# Patient Record
Sex: FEMALE | Race: WHITE | Employment: UNEMPLOYED | URBAN - METROPOLITAN AREA
[De-identification: names, ages, dates, MRNs, and addresses within clinical notes are randomized per-mention and may not be internally consistent; named-entity substitution may affect disease eponyms.]

---

## 2018-05-31 ENCOUNTER — HOSPITAL ENCOUNTER (OUTPATIENT)
Age: 23
Discharge: HOME | End: 2018-05-31
Payer: COMMERCIAL

## 2018-05-31 LAB
ALBUMIN SERPL-MCNC: 4.4 G/DL (ref 3.5–5.2)
ALP BLD-CCNC: 56 U/L (ref 35–104)
ALT SERPL-CCNC: 15 U/L (ref 0–32)
ANION GAP SERPL CALCULATED.3IONS-SCNC: 10 MMOL/L (ref 7–16)
AST SERPL-CCNC: 17 U/L (ref 0–31)
BASOPHILS ABSOLUTE: 0.05 E9/L (ref 0–0.2)
BASOPHILS RELATIVE PERCENT: 1 % (ref 0–2)
BILIRUB SERPL-MCNC: 0.7 MG/DL (ref 0–1.2)
BUN BLDV-MCNC: 8 MG/DL (ref 6–20)
CALCIUM SERPL-MCNC: 9 MG/DL (ref 8.6–10.2)
CHLORIDE BLD-SCNC: 105 MMOL/L (ref 98–107)
CO2: 27 MMOL/L (ref 22–29)
CREAT SERPL-MCNC: 0.7 MG/DL (ref 0.5–1)
EOSINOPHILS ABSOLUTE: 0.24 E9/L (ref 0.05–0.5)
EOSINOPHILS RELATIVE PERCENT: 4.9 % (ref 0–6)
GFR AFRICAN AMERICAN: >60
GFR NON-AFRICAN AMERICAN: >60 ML/MIN/1.73
GLUCOSE BLD-MCNC: 92 MG/DL (ref 74–109)
HCT VFR BLD CALC: 39.2 % (ref 34–48)
HEMOGLOBIN: 12.7 G/DL (ref 11.5–15.5)
IMMATURE GRANULOCYTES #: 0.02 E9/L
IMMATURE GRANULOCYTES %: 0.4 % (ref 0–5)
LYMPHOCYTES ABSOLUTE: 2.14 E9/L (ref 1.5–4)
LYMPHOCYTES RELATIVE PERCENT: 43.3 % (ref 20–42)
MCH RBC QN AUTO: 28.2 PG (ref 26–35)
MCHC RBC AUTO-ENTMCNC: 32.4 % (ref 32–34.5)
MCV RBC AUTO: 87.1 FL (ref 80–99.9)
MONOCYTES ABSOLUTE: 0.56 E9/L (ref 0.1–0.95)
MONOCYTES RELATIVE PERCENT: 11.3 % (ref 2–12)
NEUTROPHILS ABSOLUTE: 1.93 E9/L (ref 1.8–7.3)
NEUTROPHILS RELATIVE PERCENT: 39.1 % (ref 43–80)
PDW BLD-RTO: 13.3 FL (ref 11.5–15)
PLATELET # BLD: 223 E9/L (ref 130–450)
PMV BLD AUTO: 10.2 FL (ref 7–12)
POTASSIUM SERPL-SCNC: 4.2 MMOL/L (ref 3.5–5)
RBC # BLD: 4.5 E12/L (ref 3.5–5.5)
SEDIMENTATION RATE, ERYTHROCYTE: 1 MM/HR (ref 0–20)
SODIUM BLD-SCNC: 142 MMOL/L (ref 132–146)
TOTAL PROTEIN: 6.6 G/DL (ref 6.4–8.3)
WBC # BLD: 4.9 E9/L (ref 4.5–11.5)

## 2018-05-31 PROCEDURE — 36415 COLL VENOUS BLD VENIPUNCTURE: CPT

## 2018-05-31 PROCEDURE — 80053 COMPREHEN METABOLIC PANEL: CPT

## 2018-05-31 PROCEDURE — 85651 RBC SED RATE NONAUTOMATED: CPT

## 2018-05-31 PROCEDURE — 85025 COMPLETE CBC W/AUTO DIFF WBC: CPT

## 2018-06-22 NOTE — H&P
[address]                               HISTORY AND PHYSICAL    PATIENT NAME: Carl Freitas                    :        1995  MED REC NO:   [id number]                            ROOM:  ACCOUNT NO:   [de-identified]                           ADMIT DATE: 2018. PROVIDER:     Judah Delacruz MD    HISTORY OF PRESENT ILLNESS: The patient is a 28-year-old white female with  complaints of persistent pelvic pain unresponsive to medical therapy. She  also had an evaluation by GI with no abnormal findings. She now presents  for surgical evaluation. PAST MEDICAL HISTORY:  Significant for depression and anxiety. History of  supraventricular tachycardia. PAST SURGICAL HISTORY:  Spontaneous vaginal delivery x1, cardiac ablation. PAST GYN HISTORY:  History of herpes. No abnormal Paps, DVT, PE, or other  STDs. SOCIAL HISTORY:  No alcohol, tobacco or drugs. FAMILY HISTORY:  Noncontributory. ALLERGIES:  No known drug allergies. MEDICATIONS:  Oral contraceptive, multivitamins, Berry's wort. REVIEW OF SYSTEMS:  Negative for cardiac, respiratory, GI or   abnormalities. PHYSICAL EXAMINATION:  VITAL SIGNS[de-identified]  Stable. Blood pressure 84/56, weight 161, height 5 feet 4  inches, BMI 28. HEENT:  Negative. LUNGS:  Clear to auscultation. CARDIAC:  Regular rate and rhythm. BREASTS:  Fibrocystic. ABDOMEN:  Obese, soft, nondistended, nontender, no masses. PELVIC:  External genitalia within normal limits. VAGINA:  No lesions. CERVIX:  No lesions. UTERUS:  Anteverted 8 weeks size, mobile, nontender. ADNEXA:  Nontender, no masses. RECTOVAGINAL:  Deferred. EXTREMITIES:  No clubbing, cyanosis, edema. NEURO:  CN II-XII are grossly intact. She is alert and oriented x4. ASSESSMENT:  The patient with chronic pelvic pain.     PLAN:  Plan is for laparoscopy, possible laparotomy, other procedures as  indicated by operative findings. Risks of the procedure have been  reviewed, including but not limited to infection, bleeding, injury to  bowel, bladder, ureter, major vessels, rectovaginal and vesicovaginal  fistula, coital problems, and need for reoperation. She has been notified  in the events of excessive hemorrhage she may require blood transfusion,  and the risks of blood transfusion have been reviewed, as including but not  limited to fever, transfusion reaction, anemia, hepatitis, AIDS or other  infections. All her questions have been answered and she wishes to proceed  with surgery.         uYdi Walter MD    D: 06/22/2018 11:27:00       T: 06/22/2018 11:28:20     [email or web address]  Job#: [id number]     Doc#: [id number]    CC:

## 2018-06-28 ENCOUNTER — ANESTHESIA EVENT (OUTPATIENT)
Dept: OPERATING ROOM | Age: 23
End: 2018-06-28
Payer: COMMERCIAL

## 2018-06-28 RX ORDER — M-VIT,TX,IRON,MINS/CALC/FOLIC 27MG-0.4MG
1 TABLET ORAL DAILY
Status: ON HOLD | COMMUNITY
End: 2019-03-13

## 2018-06-29 ENCOUNTER — ANESTHESIA (OUTPATIENT)
Dept: OPERATING ROOM | Age: 23
End: 2018-06-29
Payer: COMMERCIAL

## 2018-06-29 ENCOUNTER — HOSPITAL ENCOUNTER (OUTPATIENT)
Age: 23
Setting detail: OUTPATIENT SURGERY
Discharge: HOME | End: 2018-06-29
Attending: LEGAL MEDICINE | Admitting: LEGAL MEDICINE
Payer: COMMERCIAL

## 2018-06-29 VITALS
OXYGEN SATURATION: 100 % | DIASTOLIC BLOOD PRESSURE: 50 MMHG | RESPIRATION RATE: 1 BRPM | SYSTOLIC BLOOD PRESSURE: 87 MMHG

## 2018-06-29 VITALS
WEIGHT: 156.8 LBS | RESPIRATION RATE: 16 BRPM | HEIGHT: 66 IN | TEMPERATURE: 97.5 F | OXYGEN SATURATION: 96 % | BODY MASS INDEX: 25.2 KG/M2 | SYSTOLIC BLOOD PRESSURE: 100 MMHG | DIASTOLIC BLOOD PRESSURE: 62 MMHG | HEART RATE: 97 BPM

## 2018-06-29 PROBLEM — R10.2 PELVIC PAIN IN FEMALE: Status: ACTIVE | Noted: 2018-06-29

## 2018-06-29 LAB
ABO/RH: NORMAL
ANTIBODY SCREEN: NORMAL
HCG QUALITATIVE: NEGATIVE

## 2018-06-29 PROCEDURE — 86900 BLOOD TYPING SEROLOGIC ABO: CPT

## 2018-06-29 PROCEDURE — 3600000014 HC SURGERY LEVEL 4 ADDTL 15MIN: Performed by: LEGAL MEDICINE

## 2018-06-29 PROCEDURE — 2580000003 HC RX 258

## 2018-06-29 PROCEDURE — 36415 COLL VENOUS BLD VENIPUNCTURE: CPT

## 2018-06-29 PROCEDURE — 2720000010 HC SURG SUPPLY STERILE: Performed by: LEGAL MEDICINE

## 2018-06-29 PROCEDURE — 84703 CHORIONIC GONADOTROPIN ASSAY: CPT

## 2018-06-29 PROCEDURE — 88305 TISSUE EXAM BY PATHOLOGIST: CPT

## 2018-06-29 PROCEDURE — 7100000001 HC PACU RECOVERY - ADDTL 15 MIN: Performed by: LEGAL MEDICINE

## 2018-06-29 PROCEDURE — 7100000000 HC PACU RECOVERY - FIRST 15 MIN: Performed by: LEGAL MEDICINE

## 2018-06-29 PROCEDURE — 6360000002 HC RX W HCPCS

## 2018-06-29 PROCEDURE — 3700000000 HC ANESTHESIA ATTENDED CARE: Performed by: LEGAL MEDICINE

## 2018-06-29 PROCEDURE — 7100000011 HC PHASE II RECOVERY - ADDTL 15 MIN: Performed by: LEGAL MEDICINE

## 2018-06-29 PROCEDURE — 2500000003 HC RX 250 WO HCPCS

## 2018-06-29 PROCEDURE — 2709999900 HC NON-CHARGEABLE SUPPLY: Performed by: LEGAL MEDICINE

## 2018-06-29 PROCEDURE — 6360000002 HC RX W HCPCS: Performed by: ANESTHESIOLOGY

## 2018-06-29 PROCEDURE — 86850 RBC ANTIBODY SCREEN: CPT

## 2018-06-29 PROCEDURE — 7100000010 HC PHASE II RECOVERY - FIRST 15 MIN: Performed by: LEGAL MEDICINE

## 2018-06-29 PROCEDURE — 2580000003 HC RX 258: Performed by: LEGAL MEDICINE

## 2018-06-29 PROCEDURE — 86901 BLOOD TYPING SEROLOGIC RH(D): CPT

## 2018-06-29 PROCEDURE — 6360000002 HC RX W HCPCS: Performed by: LEGAL MEDICINE

## 2018-06-29 PROCEDURE — 3600000004 HC SURGERY LEVEL 4 BASE: Performed by: LEGAL MEDICINE

## 2018-06-29 PROCEDURE — 3700000001 HC ADD 15 MINUTES (ANESTHESIA): Performed by: LEGAL MEDICINE

## 2018-06-29 RX ORDER — ONDANSETRON 2 MG/ML
INJECTION INTRAMUSCULAR; INTRAVENOUS PRN
Status: DISCONTINUED | OUTPATIENT
Start: 2018-06-29 | End: 2018-06-29 | Stop reason: SDUPTHER

## 2018-06-29 RX ORDER — SODIUM CHLORIDE, SODIUM LACTATE, POTASSIUM CHLORIDE, CALCIUM CHLORIDE 600; 310; 30; 20 MG/100ML; MG/100ML; MG/100ML; MG/100ML
INJECTION, SOLUTION INTRAVENOUS CONTINUOUS PRN
Status: DISCONTINUED | OUTPATIENT
Start: 2018-06-29 | End: 2018-06-29 | Stop reason: SDUPTHER

## 2018-06-29 RX ORDER — MORPHINE SULFATE 2 MG/ML
2 INJECTION, SOLUTION INTRAMUSCULAR; INTRAVENOUS EVERY 5 MIN PRN
Status: DISCONTINUED | OUTPATIENT
Start: 2018-06-29 | End: 2018-06-29 | Stop reason: HOSPADM

## 2018-06-29 RX ORDER — SODIUM CHLORIDE 0.9 % (FLUSH) 0.9 %
10 SYRINGE (ML) INJECTION EVERY 12 HOURS SCHEDULED
Status: DISCONTINUED | OUTPATIENT
Start: 2018-06-29 | End: 2018-06-29 | Stop reason: HOSPADM

## 2018-06-29 RX ORDER — ROCURONIUM BROMIDE 10 MG/ML
INJECTION, SOLUTION INTRAVENOUS PRN
Status: DISCONTINUED | OUTPATIENT
Start: 2018-06-29 | End: 2018-06-29 | Stop reason: SDUPTHER

## 2018-06-29 RX ORDER — PROPOFOL 10 MG/ML
INJECTION, EMULSION INTRAVENOUS PRN
Status: DISCONTINUED | OUTPATIENT
Start: 2018-06-29 | End: 2018-06-29 | Stop reason: SDUPTHER

## 2018-06-29 RX ORDER — ONDANSETRON 2 MG/ML
4 INJECTION INTRAMUSCULAR; INTRAVENOUS EVERY 6 HOURS PRN
Status: DISCONTINUED | OUTPATIENT
Start: 2018-06-29 | End: 2018-06-29 | Stop reason: HOSPADM

## 2018-06-29 RX ORDER — FENTANYL CITRATE 50 UG/ML
INJECTION, SOLUTION INTRAMUSCULAR; INTRAVENOUS PRN
Status: DISCONTINUED | OUTPATIENT
Start: 2018-06-29 | End: 2018-06-29 | Stop reason: SDUPTHER

## 2018-06-29 RX ORDER — SODIUM CHLORIDE, SODIUM LACTATE, POTASSIUM CHLORIDE, CALCIUM CHLORIDE 600; 310; 30; 20 MG/100ML; MG/100ML; MG/100ML; MG/100ML
INJECTION, SOLUTION INTRAVENOUS CONTINUOUS
Status: DISCONTINUED | OUTPATIENT
Start: 2018-06-29 | End: 2018-06-29 | Stop reason: HOSPADM

## 2018-06-29 RX ORDER — DEXAMETHASONE SODIUM PHOSPHATE 10 MG/ML
INJECTION, SOLUTION INTRAMUSCULAR; INTRAVENOUS PRN
Status: DISCONTINUED | OUTPATIENT
Start: 2018-06-29 | End: 2018-06-29 | Stop reason: SDUPTHER

## 2018-06-29 RX ORDER — OXYCODONE HYDROCHLORIDE AND ACETAMINOPHEN 5; 325 MG/1; MG/1
1 TABLET ORAL EVERY 4 HOURS PRN
Status: DISCONTINUED | OUTPATIENT
Start: 2018-06-29 | End: 2018-06-29 | Stop reason: HOSPADM

## 2018-06-29 RX ORDER — OXYCODONE HYDROCHLORIDE AND ACETAMINOPHEN 5; 325 MG/1; MG/1
2 TABLET ORAL EVERY 4 HOURS PRN
Status: DISCONTINUED | OUTPATIENT
Start: 2018-06-29 | End: 2018-06-29 | Stop reason: HOSPADM

## 2018-06-29 RX ORDER — ACETAMINOPHEN 325 MG/1
650 TABLET ORAL EVERY 4 HOURS PRN
Status: DISCONTINUED | OUTPATIENT
Start: 2018-06-29 | End: 2018-06-29 | Stop reason: HOSPADM

## 2018-06-29 RX ORDER — MIDAZOLAM HYDROCHLORIDE 1 MG/ML
INJECTION INTRAMUSCULAR; INTRAVENOUS PRN
Status: DISCONTINUED | OUTPATIENT
Start: 2018-06-29 | End: 2018-06-29 | Stop reason: SDUPTHER

## 2018-06-29 RX ORDER — MORPHINE SULFATE 2 MG/ML
1 INJECTION, SOLUTION INTRAMUSCULAR; INTRAVENOUS EVERY 5 MIN PRN
Status: DISCONTINUED | OUTPATIENT
Start: 2018-06-29 | End: 2018-06-29 | Stop reason: HOSPADM

## 2018-06-29 RX ORDER — SODIUM CHLORIDE 0.9 % (FLUSH) 0.9 %
10 SYRINGE (ML) INJECTION PRN
Status: DISCONTINUED | OUTPATIENT
Start: 2018-06-29 | End: 2018-06-29 | Stop reason: HOSPADM

## 2018-06-29 RX ORDER — LIDOCAINE HYDROCHLORIDE 20 MG/ML
INJECTION, SOLUTION INFILTRATION; PERINEURAL PRN
Status: DISCONTINUED | OUTPATIENT
Start: 2018-06-29 | End: 2018-06-29 | Stop reason: SDUPTHER

## 2018-06-29 RX ADMIN — CEFAZOLIN SODIUM 2 G: 2 SOLUTION INTRAVENOUS at 10:27

## 2018-06-29 RX ADMIN — DEXAMETHASONE SODIUM PHOSPHATE 10 MG: 10 INJECTION, SOLUTION INTRAMUSCULAR; INTRAVENOUS at 10:32

## 2018-06-29 RX ADMIN — SODIUM CHLORIDE, POTASSIUM CHLORIDE, SODIUM LACTATE AND CALCIUM CHLORIDE: 600; 310; 30; 20 INJECTION, SOLUTION INTRAVENOUS at 07:58

## 2018-06-29 RX ADMIN — ROCURONIUM BROMIDE 25 MG: 10 INJECTION, SOLUTION INTRAVENOUS at 10:55

## 2018-06-29 RX ADMIN — ONDANSETRON 4 MG: 2 INJECTION, SOLUTION INTRAMUSCULAR; INTRAVENOUS at 10:32

## 2018-06-29 RX ADMIN — SODIUM CHLORIDE, POTASSIUM CHLORIDE, SODIUM LACTATE AND CALCIUM CHLORIDE: 600; 310; 30; 20 INJECTION, SOLUTION INTRAVENOUS at 10:27

## 2018-06-29 RX ADMIN — HYDROMORPHONE HYDROCHLORIDE 0.5 MG: 1 INJECTION, SOLUTION INTRAMUSCULAR; INTRAVENOUS; SUBCUTANEOUS at 11:53

## 2018-06-29 RX ADMIN — ROCURONIUM BROMIDE 40 MG: 10 INJECTION, SOLUTION INTRAVENOUS at 10:32

## 2018-06-29 RX ADMIN — FENTANYL CITRATE 100 MCG: 50 INJECTION, SOLUTION INTRAMUSCULAR; INTRAVENOUS at 10:32

## 2018-06-29 RX ADMIN — LIDOCAINE HYDROCHLORIDE 80 MG: 20 INJECTION, SOLUTION INFILTRATION; PERINEURAL at 10:32

## 2018-06-29 RX ADMIN — PROPOFOL 180 MG: 10 INJECTION, EMULSION INTRAVENOUS at 10:32

## 2018-06-29 RX ADMIN — MIDAZOLAM HYDROCHLORIDE 2 MG: 1 INJECTION INTRAMUSCULAR; INTRAVENOUS at 10:27

## 2018-06-29 RX ADMIN — HYDROMORPHONE HYDROCHLORIDE 0.5 MG: 1 INJECTION, SOLUTION INTRAMUSCULAR; INTRAVENOUS; SUBCUTANEOUS at 11:59

## 2018-06-29 RX ADMIN — FENTANYL CITRATE 100 MCG: 50 INJECTION, SOLUTION INTRAMUSCULAR; INTRAVENOUS at 11:35

## 2018-06-29 ASSESSMENT — PULMONARY FUNCTION TESTS
PIF_VALUE: 1
PIF_VALUE: 15
PIF_VALUE: 27
PIF_VALUE: 14
PIF_VALUE: 19
PIF_VALUE: 29
PIF_VALUE: 15
PIF_VALUE: 18
PIF_VALUE: 28
PIF_VALUE: 1
PIF_VALUE: 19
PIF_VALUE: 27
PIF_VALUE: 1
PIF_VALUE: 1
PIF_VALUE: 3
PIF_VALUE: 1
PIF_VALUE: 27
PIF_VALUE: 15
PIF_VALUE: 13
PIF_VALUE: 28
PIF_VALUE: 28
PIF_VALUE: 29
PIF_VALUE: 1
PIF_VALUE: 31
PIF_VALUE: 14
PIF_VALUE: 18
PIF_VALUE: 27
PIF_VALUE: 23
PIF_VALUE: 15
PIF_VALUE: 28
PIF_VALUE: 28
PIF_VALUE: 18
PIF_VALUE: 19
PIF_VALUE: 27
PIF_VALUE: 15
PIF_VALUE: 27
PIF_VALUE: 28
PIF_VALUE: 30
PIF_VALUE: 13
PIF_VALUE: 19
PIF_VALUE: 27
PIF_VALUE: 1
PIF_VALUE: 28
PIF_VALUE: 28
PIF_VALUE: 19
PIF_VALUE: 28
PIF_VALUE: 13
PIF_VALUE: 28
PIF_VALUE: 23
PIF_VALUE: 28
PIF_VALUE: 19
PIF_VALUE: 28
PIF_VALUE: 26
PIF_VALUE: 16
PIF_VALUE: 19
PIF_VALUE: 27
PIF_VALUE: 15
PIF_VALUE: 27
PIF_VALUE: 19
PIF_VALUE: 24
PIF_VALUE: 16
PIF_VALUE: 28
PIF_VALUE: 16
PIF_VALUE: 16
PIF_VALUE: 30
PIF_VALUE: 15
PIF_VALUE: 15
PIF_VALUE: 28
PIF_VALUE: 28
PIF_VALUE: 26

## 2018-06-29 ASSESSMENT — PAIN DESCRIPTION - FREQUENCY
FREQUENCY: CONTINUOUS
FREQUENCY: INTERMITTENT
FREQUENCY: CONTINUOUS

## 2018-06-29 ASSESSMENT — PAIN DESCRIPTION - PAIN TYPE
TYPE: ACUTE PAIN;CHRONIC PAIN
TYPE: ACUTE PAIN;CHRONIC PAIN

## 2018-06-29 ASSESSMENT — PAIN SCALES - GENERAL
PAINLEVEL_OUTOF10: 1
PAINLEVEL_OUTOF10: 0
PAINLEVEL_OUTOF10: 0
PAINLEVEL_OUTOF10: 1
PAINLEVEL_OUTOF10: 5
PAINLEVEL_OUTOF10: 9

## 2018-06-29 ASSESSMENT — PAIN DESCRIPTION - ORIENTATION
ORIENTATION: POSTERIOR
ORIENTATION: POSTERIOR

## 2018-06-29 ASSESSMENT — PAIN DESCRIPTION - DESCRIPTORS
DESCRIPTORS: DULL
DESCRIPTORS: CONSTANT;DISCOMFORT;SORE
DESCRIPTORS: CONSTANT;DISCOMFORT;SORE

## 2018-06-29 ASSESSMENT — PAIN DESCRIPTION - ONSET
ONSET: ON-GOING
ONSET: ON-GOING

## 2018-06-29 ASSESSMENT — PAIN DESCRIPTION - LOCATION
LOCATION: OTHER (COMMENT);TOE (COMMENT WHICH ONE)
LOCATION: OTHER (COMMENT)

## 2018-06-29 ASSESSMENT — PAIN DESCRIPTION - PROGRESSION
CLINICAL_PROGRESSION: GRADUALLY WORSENING
CLINICAL_PROGRESSION: GRADUALLY IMPROVING

## 2018-06-29 ASSESSMENT — PAIN - FUNCTIONAL ASSESSMENT: PAIN_FUNCTIONAL_ASSESSMENT: 0-10

## 2018-06-29 NOTE — OP NOTE
Veress needle was  directed into the abdominal cavity at a 45 degree angle. Correct placement  was ensured with the water drop test.   3 liters of CO2 gas was  insufflated into the abdomen. Veress needle was discarded. Anterior  abdominal wall was sharply tented and a 5 mm trocar was directed into the  abdominal cavity at a 45 degrees angle. Correct placement was ensured with  the laparoscope. Under direct visualization, 5 mm trocars were placed in  the right and left lower quadrants, with care to avoid the obliterated  umbilical artery and inferior epigastric vessels. Pelvic findings were as  previously indicated. The left side wall was opened and adhesions binding  the endometriotic implants to the ureter were released with care to avoid  any injury to the ureter. The endometriotic implants were then resected. Care was made to assure there was no injury to the bowel or the ureter. Copious irrigation was performed with good hemostasis noted. 300 mL of  lactated Ringer's was allowed to remain in the pelvis in order to prevent  adhesion formation. All trocars removed under direct visualization after  CO2 gas was allowed to escape from the abdominal cavity. Skin incisions  reapproximated in running fashion. All instruments were removed from  vagina. Lr catheter was removed. Sponge, lap, needle counts were  correct. There were no complications. The patient tolerated procedure  well went to recovery room in stable condition.         Nelson Henley MD    D: 06/29/2018 12:17:39       T: 06/29/2018 12:19:57     [email or web address]  Job#: [id number]     Doc#: [id number]    CC:

## 2018-09-14 ENCOUNTER — HOSPITAL ENCOUNTER (OUTPATIENT)
Dept: ULTRASOUND IMAGING | Age: 23
Discharge: HOME | End: 2018-09-14
Payer: COMMERCIAL

## 2018-09-14 DIAGNOSIS — N64.52 DISCHARGE FROM LEFT NIPPLE: ICD-10-CM

## 2018-09-14 PROCEDURE — 76641 ULTRASOUND BREAST COMPLETE: CPT

## 2019-01-01 ENCOUNTER — HOSPITAL ENCOUNTER (OUTPATIENT)
Age: 24
Discharge: HOME | End: 2019-01-01
Attending: LEGAL MEDICINE | Admitting: LEGAL MEDICINE
Payer: COMMERCIAL

## 2019-01-01 VITALS — HEART RATE: 96 BPM | DIASTOLIC BLOOD PRESSURE: 55 MMHG | RESPIRATION RATE: 18 BRPM | SYSTOLIC BLOOD PRESSURE: 103 MMHG

## 2019-01-01 PROBLEM — Z34.92 NORMAL PREGNANCY IN SECOND TRIMESTER: Status: ACTIVE | Noted: 2019-01-01

## 2019-01-01 LAB
ALBUMIN SERPL-MCNC: 3.6 G/DL (ref 3.5–5.2)
ALP BLD-CCNC: 62 U/L (ref 35–104)
ALT SERPL-CCNC: 17 U/L (ref 0–32)
ANION GAP SERPL CALCULATED.3IONS-SCNC: 10 MMOL/L (ref 7–16)
AST SERPL-CCNC: 21 U/L (ref 0–31)
BASOPHILS ABSOLUTE: 0.04 E9/L (ref 0–0.2)
BASOPHILS RELATIVE PERCENT: 0.5 % (ref 0–2)
BILIRUB SERPL-MCNC: 0.4 MG/DL (ref 0–1.2)
BUN BLDV-MCNC: 6 MG/DL (ref 6–20)
CALCIUM SERPL-MCNC: 8.5 MG/DL (ref 8.6–10.2)
CHLORIDE BLD-SCNC: 104 MMOL/L (ref 98–107)
CO2: 22 MMOL/L (ref 22–29)
CREAT SERPL-MCNC: 0.5 MG/DL (ref 0.5–1)
EOSINOPHILS ABSOLUTE: 0.1 E9/L (ref 0.05–0.5)
EOSINOPHILS RELATIVE PERCENT: 1.3 % (ref 0–6)
GFR AFRICAN AMERICAN: >60
GFR NON-AFRICAN AMERICAN: >60 ML/MIN/1.73
GLUCOSE BLD-MCNC: 79 MG/DL (ref 74–99)
HCT VFR BLD CALC: 33.4 % (ref 34–48)
HEMOGLOBIN: 11 G/DL (ref 11.5–15.5)
IMMATURE GRANULOCYTES #: 0.08 E9/L
IMMATURE GRANULOCYTES %: 1.1 % (ref 0–5)
LYMPHOCYTES ABSOLUTE: 1.39 E9/L (ref 1.5–4)
LYMPHOCYTES RELATIVE PERCENT: 18.4 % (ref 20–42)
MCH RBC QN AUTO: 29 PG (ref 26–35)
MCHC RBC AUTO-ENTMCNC: 32.9 % (ref 32–34.5)
MCV RBC AUTO: 88.1 FL (ref 80–99.9)
MONOCYTES ABSOLUTE: 0.61 E9/L (ref 0.1–0.95)
MONOCYTES RELATIVE PERCENT: 8.1 % (ref 2–12)
NEUTROPHILS ABSOLUTE: 5.34 E9/L (ref 1.8–7.3)
NEUTROPHILS RELATIVE PERCENT: 70.6 % (ref 43–80)
PDW BLD-RTO: 13.2 FL (ref 11.5–15)
PLATELET # BLD: 201 E9/L (ref 130–450)
PMV BLD AUTO: 10.6 FL (ref 7–12)
POTASSIUM SERPL-SCNC: 3.8 MMOL/L (ref 3.5–5)
RBC # BLD: 3.79 E12/L (ref 3.5–5.5)
SODIUM BLD-SCNC: 136 MMOL/L (ref 132–146)
TOTAL PROTEIN: 6.1 G/DL (ref 6.4–8.3)
WBC # BLD: 7.6 E9/L (ref 4.5–11.5)

## 2019-01-01 PROCEDURE — 36415 COLL VENOUS BLD VENIPUNCTURE: CPT

## 2019-01-01 PROCEDURE — 99211 OFF/OP EST MAY X REQ PHY/QHP: CPT

## 2019-01-01 PROCEDURE — 85025 COMPLETE CBC W/AUTO DIFF WBC: CPT

## 2019-01-01 PROCEDURE — 80053 COMPREHEN METABOLIC PANEL: CPT

## 2019-01-15 ENCOUNTER — HOSPITAL ENCOUNTER (OUTPATIENT)
Dept: ULTRASOUND IMAGING | Age: 24
Discharge: HOME | End: 2019-01-15
Payer: COMMERCIAL

## 2019-01-15 DIAGNOSIS — R10.13 EPIGASTRIC PAIN: ICD-10-CM

## 2019-01-15 PROCEDURE — 76705 ECHO EXAM OF ABDOMEN: CPT

## 2019-02-21 ENCOUNTER — INITIAL PRENATAL (OUTPATIENT)
Dept: OBGYN CLINIC | Age: 24
End: 2019-02-21
Payer: COMMERCIAL

## 2019-02-21 VITALS
HEART RATE: 94 BPM | DIASTOLIC BLOOD PRESSURE: 64 MMHG | BODY MASS INDEX: 29.43 KG/M2 | SYSTOLIC BLOOD PRESSURE: 94 MMHG | WEIGHT: 183.13 LBS | HEIGHT: 66 IN

## 2019-02-21 DIAGNOSIS — M54.31 BILATERAL SCIATICA: ICD-10-CM

## 2019-02-21 DIAGNOSIS — Z3A.27 27 WEEKS GESTATION OF PREGNANCY: Primary | ICD-10-CM

## 2019-02-21 DIAGNOSIS — M54.32 BILATERAL SCIATICA: ICD-10-CM

## 2019-02-21 DIAGNOSIS — O41.8X10 SUBCHORIONIC HEMATOMA IN FIRST TRIMESTER, SINGLE OR UNSPECIFIED FETUS: ICD-10-CM

## 2019-02-21 DIAGNOSIS — Z86.79 PERSONAL HISTORY OF SUPRAVENTRICULAR TACHYCARDIA: ICD-10-CM

## 2019-02-21 DIAGNOSIS — Z87.42 PERSONAL HISTORY OF ENDOMETRIOSIS: ICD-10-CM

## 2019-02-21 DIAGNOSIS — I45.6 WOLFF-PARKINSON-WHITE (WPW) SYNDROME: ICD-10-CM

## 2019-02-21 DIAGNOSIS — Z83.49 FAMILY HISTORY OF HEMOCHROMATOSIS: ICD-10-CM

## 2019-02-21 DIAGNOSIS — O46.8X1 SUBCHORIONIC HEMATOMA IN FIRST TRIMESTER, SINGLE OR UNSPECIFIED FETUS: ICD-10-CM

## 2019-02-21 DIAGNOSIS — Z86.59 HISTORY OF DEPRESSION: ICD-10-CM

## 2019-02-21 LAB
GLUCOSE URINE, POC: NORMAL
PROTEIN UA: NEGATIVE

## 2019-02-21 PROCEDURE — 81002 URINALYSIS NONAUTO W/O SCOPE: CPT | Performed by: OBSTETRICS & GYNECOLOGY

## 2019-02-21 PROCEDURE — G8427 DOCREV CUR MEDS BY ELIG CLIN: HCPCS | Performed by: OBSTETRICS & GYNECOLOGY

## 2019-02-21 PROCEDURE — G8419 CALC BMI OUT NRM PARAM NOF/U: HCPCS | Performed by: OBSTETRICS & GYNECOLOGY

## 2019-02-21 PROCEDURE — G8484 FLU IMMUNIZE NO ADMIN: HCPCS | Performed by: OBSTETRICS & GYNECOLOGY

## 2019-02-21 PROCEDURE — 99201 HC NEW PT, E/M LEVEL 1: CPT | Performed by: OBSTETRICS & GYNECOLOGY

## 2019-02-21 PROCEDURE — 1036F TOBACCO NON-USER: CPT | Performed by: OBSTETRICS & GYNECOLOGY

## 2019-02-21 PROCEDURE — 76817 TRANSVAGINAL US OBSTETRIC: CPT | Performed by: OBSTETRICS & GYNECOLOGY

## 2019-02-21 PROCEDURE — 99204 OFFICE O/P NEW MOD 45 MIN: CPT | Performed by: OBSTETRICS & GYNECOLOGY

## 2019-02-21 PROCEDURE — 76811 OB US DETAILED SNGL FETUS: CPT | Performed by: OBSTETRICS & GYNECOLOGY

## 2019-02-21 PROCEDURE — 76819 FETAL BIOPHYS PROFIL W/O NST: CPT | Performed by: OBSTETRICS & GYNECOLOGY

## 2019-02-21 RX ORDER — MAGNESIUM OXIDE 400 MG/1
400 TABLET ORAL 2 TIMES DAILY
COMMUNITY

## 2019-03-07 ENCOUNTER — ROUTINE PRENATAL (OUTPATIENT)
Dept: OBGYN CLINIC | Age: 24
End: 2019-03-07
Payer: COMMERCIAL

## 2019-03-07 VITALS
SYSTOLIC BLOOD PRESSURE: 92 MMHG | HEART RATE: 98 BPM | WEIGHT: 189.38 LBS | BODY MASS INDEX: 30.57 KG/M2 | DIASTOLIC BLOOD PRESSURE: 63 MMHG

## 2019-03-07 DIAGNOSIS — Z86.79 PERSONAL HISTORY OF SUPRAVENTRICULAR TACHYCARDIA: ICD-10-CM

## 2019-03-07 DIAGNOSIS — M54.32 BILATERAL SCIATICA: ICD-10-CM

## 2019-03-07 DIAGNOSIS — Z83.49 FAMILY HISTORY OF HEMOCHROMATOSIS: ICD-10-CM

## 2019-03-07 DIAGNOSIS — I45.6 WOLFF-PARKINSON-WHITE (WPW) SYNDROME: ICD-10-CM

## 2019-03-07 DIAGNOSIS — O46.8X1 SUBCHORIONIC HEMATOMA IN FIRST TRIMESTER, SINGLE OR UNSPECIFIED FETUS: ICD-10-CM

## 2019-03-07 DIAGNOSIS — O41.8X10 SUBCHORIONIC HEMATOMA IN FIRST TRIMESTER, SINGLE OR UNSPECIFIED FETUS: ICD-10-CM

## 2019-03-07 DIAGNOSIS — Z87.42 PERSONAL HISTORY OF ENDOMETRIOSIS: ICD-10-CM

## 2019-03-07 DIAGNOSIS — Z3A.29 29 WEEKS GESTATION OF PREGNANCY: Primary | ICD-10-CM

## 2019-03-07 DIAGNOSIS — M54.31 BILATERAL SCIATICA: ICD-10-CM

## 2019-03-07 DIAGNOSIS — Z86.59 HISTORY OF DEPRESSION: ICD-10-CM

## 2019-03-07 LAB
GLUCOSE URINE, POC: NORMAL
PROTEIN UA: NEGATIVE

## 2019-03-07 PROCEDURE — 76819 FETAL BIOPHYS PROFIL W/O NST: CPT | Performed by: OBSTETRICS & GYNECOLOGY

## 2019-03-07 PROCEDURE — 81002 URINALYSIS NONAUTO W/O SCOPE: CPT | Performed by: OBSTETRICS & GYNECOLOGY

## 2019-03-07 PROCEDURE — 76815 OB US LIMITED FETUS(S): CPT | Performed by: OBSTETRICS & GYNECOLOGY

## 2019-03-07 PROCEDURE — G8427 DOCREV CUR MEDS BY ELIG CLIN: HCPCS | Performed by: OBSTETRICS & GYNECOLOGY

## 2019-03-07 PROCEDURE — G8417 CALC BMI ABV UP PARAM F/U: HCPCS | Performed by: OBSTETRICS & GYNECOLOGY

## 2019-03-07 PROCEDURE — 99211 OFF/OP EST MAY X REQ PHY/QHP: CPT | Performed by: OBSTETRICS & GYNECOLOGY

## 2019-03-07 PROCEDURE — 1036F TOBACCO NON-USER: CPT | Performed by: OBSTETRICS & GYNECOLOGY

## 2019-03-07 PROCEDURE — 99213 OFFICE O/P EST LOW 20 MIN: CPT | Performed by: OBSTETRICS & GYNECOLOGY

## 2019-03-07 PROCEDURE — G8484 FLU IMMUNIZE NO ADMIN: HCPCS | Performed by: OBSTETRICS & GYNECOLOGY

## 2019-03-07 PROCEDURE — 99211 OFF/OP EST MAY X REQ PHY/QHP: CPT

## 2019-03-13 ENCOUNTER — HOSPITAL ENCOUNTER (OUTPATIENT)
Age: 24
Discharge: HOME | End: 2019-03-14
Attending: LEGAL MEDICINE | Admitting: LEGAL MEDICINE
Payer: COMMERCIAL

## 2019-03-13 VITALS
WEIGHT: 183 LBS | TEMPERATURE: 98.8 F | HEIGHT: 66 IN | SYSTOLIC BLOOD PRESSURE: 123 MMHG | HEART RATE: 106 BPM | BODY MASS INDEX: 29.41 KG/M2 | DIASTOLIC BLOOD PRESSURE: 67 MMHG | RESPIRATION RATE: 16 BRPM

## 2019-03-14 PROBLEM — O26.90 PREGNANCY, ANTEPARTUM, COMPLICATIONS: Status: ACTIVE | Noted: 2019-03-14

## 2019-03-14 LAB
BACTERIA: NORMAL /HPF
BILIRUBIN URINE: NEGATIVE
BLOOD, URINE: ABNORMAL
CLARITY: CLEAR
COLOR: ABNORMAL
EPITHELIAL CELLS, UA: NORMAL /HPF
FETAL FIBRONECTIN: NEGATIVE
GLUCOSE URINE: NEGATIVE MG/DL
KETONES, URINE: NEGATIVE MG/DL
LEUKOCYTE ESTERASE, URINE: NEGATIVE
NITRITE, URINE: NEGATIVE
PH UA: 7 (ref 5–9)
PROTEIN UA: NEGATIVE MG/DL
RBC UA: NORMAL /HPF (ref 0–2)
SPECIFIC GRAVITY UA: <=1.005 (ref 1–1.03)
UROBILINOGEN, URINE: 0.2 E.U./DL
WBC UA: NORMAL /HPF (ref 0–5)

## 2019-03-14 PROCEDURE — 81001 URINALYSIS AUTO W/SCOPE: CPT

## 2019-03-14 PROCEDURE — 99211 OFF/OP EST MAY X REQ PHY/QHP: CPT

## 2019-03-14 PROCEDURE — 82731 ASSAY OF FETAL FIBRONECTIN: CPT

## 2019-04-10 ENCOUNTER — HOSPITAL ENCOUNTER (OUTPATIENT)
Dept: ULTRASOUND IMAGING | Age: 24
Discharge: HOME | End: 2019-04-10
Payer: COMMERCIAL

## 2019-04-10 DIAGNOSIS — R22.32 MASS OF LEFT AXILLA: ICD-10-CM

## 2019-04-10 PROCEDURE — 76642 ULTRASOUND BREAST LIMITED: CPT

## 2019-04-18 ENCOUNTER — ROUTINE PRENATAL (OUTPATIENT)
Dept: OBGYN CLINIC | Age: 24
End: 2019-04-18
Payer: COMMERCIAL

## 2019-04-18 VITALS
SYSTOLIC BLOOD PRESSURE: 109 MMHG | DIASTOLIC BLOOD PRESSURE: 71 MMHG | HEART RATE: 94 BPM | WEIGHT: 203.25 LBS | BODY MASS INDEX: 32.81 KG/M2

## 2019-04-18 DIAGNOSIS — M54.32 BILATERAL SCIATICA: ICD-10-CM

## 2019-04-18 DIAGNOSIS — Z86.79 PERSONAL HISTORY OF SUPRAVENTRICULAR TACHYCARDIA: ICD-10-CM

## 2019-04-18 DIAGNOSIS — Z86.59 HISTORY OF DEPRESSION: ICD-10-CM

## 2019-04-18 DIAGNOSIS — M54.31 BILATERAL SCIATICA: ICD-10-CM

## 2019-04-18 DIAGNOSIS — O46.8X1 SUBCHORIONIC HEMATOMA IN FIRST TRIMESTER, SINGLE OR UNSPECIFIED FETUS: ICD-10-CM

## 2019-04-18 DIAGNOSIS — Z83.49 FAMILY HISTORY OF HEMOCHROMATOSIS: ICD-10-CM

## 2019-04-18 DIAGNOSIS — O41.8X10 SUBCHORIONIC HEMATOMA IN FIRST TRIMESTER, SINGLE OR UNSPECIFIED FETUS: ICD-10-CM

## 2019-04-18 DIAGNOSIS — Z3A.35 35 WEEKS GESTATION OF PREGNANCY: Primary | ICD-10-CM

## 2019-04-18 DIAGNOSIS — I45.6 WOLFF-PARKINSON-WHITE (WPW) SYNDROME: ICD-10-CM

## 2019-04-18 LAB
GLUCOSE URINE, POC: NEGATIVE
PROTEIN UA: NEGATIVE

## 2019-04-18 PROCEDURE — 1036F TOBACCO NON-USER: CPT | Performed by: OBSTETRICS & GYNECOLOGY

## 2019-04-18 PROCEDURE — G8427 DOCREV CUR MEDS BY ELIG CLIN: HCPCS | Performed by: OBSTETRICS & GYNECOLOGY

## 2019-04-18 PROCEDURE — 99213 OFFICE O/P EST LOW 20 MIN: CPT | Performed by: OBSTETRICS & GYNECOLOGY

## 2019-04-18 PROCEDURE — 81002 URINALYSIS NONAUTO W/O SCOPE: CPT | Performed by: OBSTETRICS & GYNECOLOGY

## 2019-04-18 PROCEDURE — 99211 OFF/OP EST MAY X REQ PHY/QHP: CPT | Performed by: OBSTETRICS & GYNECOLOGY

## 2019-04-18 PROCEDURE — G8417 CALC BMI ABV UP PARAM F/U: HCPCS | Performed by: OBSTETRICS & GYNECOLOGY

## 2019-04-18 PROCEDURE — 76819 FETAL BIOPHYS PROFIL W/O NST: CPT | Performed by: OBSTETRICS & GYNECOLOGY

## 2019-04-18 PROCEDURE — 76816 OB US FOLLOW-UP PER FETUS: CPT | Performed by: OBSTETRICS & GYNECOLOGY

## 2019-04-18 RX ORDER — MULTIVIT WITH MINERALS/LUTEIN
250 TABLET ORAL 2 TIMES DAILY
COMMUNITY

## 2019-04-18 RX ORDER — DOXYCYCLINE HYCLATE 50 MG/1
324 CAPSULE, GELATIN COATED ORAL 2 TIMES DAILY WITH MEALS
COMMUNITY

## 2019-04-18 NOTE — PROGRESS NOTES
[facility]  [address]  [address] [facility]  Office [phone]  Fax [phone]    2019    RE:  Blanca Thompson     : 1995   AGE: 21 y.o. Dear Dr. Bernard Vargas,    Thank you for allowing me to provide prenatal consultation for Blanca Thompson. As I'm sure you will recall, Blanca Thompson is a 21 y.o. -0-0-1 Patient's last menstrual period was 2018 (approximate). Estimated Date of Delivery: 19 at 35 6/7 weeks seen in our office today for the following: Fetal growth exam and supraventricular tachycardia. REASON FOR CONSULTATION:  Patient Active Problem List    Diagnosis Date Noted    35 weeks gestation of pregnancy 2019    Personal history of supraventricular tachycardia 2019    Anson-Parkinson-White (WPW) syndrome 2019    Subchorionic hematoma in first trimester 2019    History of depression 2019    Family history of hemochromatosis 2019    Bilateral sciatica 2019    Personal history of endometriosis 2019     PAST HISTORY:  OB History    Para Term  AB Living   2 1 1     1   SAB TAB Ectopic Molar Multiple Live Births           0 1      # Outcome Date GA Lbr Marcos/2nd Weight Sex Delivery Anes PTL Lv   2 Current            1 Term  40w2d  7 lb 3 oz (3.26 kg) F Vag-Spont EPI N HONEY   GYNECOLOGICAL:  Last pap smear: Normal  Abnormal pap smears requiring surgical treatment No.  Sexually transmitted diseases: negative  negative for HPV, and Negative for other sexually transmitted diseases. MEDICAL:  Past Medical History:   Diagnosis Date    Anemia     had previously    Depression     Endometriosis     2018    Heart abnormality     Hx. SVT-ablation in     Herpes simplex virus (HSV) infection      SURGICAL:   positive for laparotomy for extensive endometriosis; heart ablation in 2015;  No other surgery    ALLERGIES:    Allergies   Allergen Reactions    Latex Rash      MEDICATIONS: Current Outpatient Medications   Medication Sig Dispense Refill    ferrous gluconate (FERGON) 324 (38 Fe) MG tablet Take 324 mg by mouth 2 times daily (with meals)      Ascorbic Acid (VITAMIN C) 250 MG tablet Take 250 mg by mouth 2 times daily      Prenatal Multivit-Min-Fe-FA (PRENATAL VITAMINS PO) Take 1 tablet by mouth daily      magnesium oxide (MAG-OX) 400 MG tablet Take 400 mg by mouth 2 times daily       No current facility-administered medications for this visit. Social History    Marital status:      Spouse name: N/A    Number of children: 1    Years of education: N/A     Social History Main Topics    Smoking status: Never Smoker    Smokeless tobacco: Never Used    Alcohol use No    Drug use: No    Sexual activity: Yes     Partners: Male     Review of Systems :   CONSTITUTIONAL : No fever, no chills   HEENT : No headache, no visual changes, no sore throat   CARDIOVASCULAR : No pain, no palpitations, some ankle edema   RESPIRATORY : No pain, no shortness of breath   GASTROINTESTINAL : N/V getting better, some low abdominal cramp-like pain abdominal pain   GENITOURINARY : No dysuria, hematuria and no incontinence   MUSCULOSKELETAL : No myalgia, Chronic back pain, some joint pain  NEUROLOGICAL : No numbness, no tingling, no tremors. No history of seizures; some bilateral sciatica pain. ALL OTHER SYSTEMS WERE REPORTED AS NEGATIVE. FAMILY MEDICAL HISTORY:   Family History   Problem Relation Age of Onset    [de-identified] / Stillbirths Mother     Heart Disease Father     Heart Disease Maternal Aunt     Heart Disease Maternal Grandfather     Depression Brother     Mental Illness Brother     Diabetes Paternal Uncle     Arthritis Paternal Grandmother     Arthritis Paternal Grandfather       OB GENETIC SCREEN:  OB Genetic Screening    Patient's Age 35+ at Date of Delivery No     Patient or [de-identified] Father Had Other Defects?  Father of baby with hemochromatosis        Jaelyn Hernandez was seen in our office today. She has no complaints today and is here for a fetal growth exam and history of supraventricular tachycardia. She reports good fetal movement and no bleeding or fluid leaking. Early in the pregnancy she did have some vaginal brown spotting. It was thought that she may have blood clot. She's had no further bleeding. In 2016, the patient had a full-term vaginal delivery of a living baby girl. She weighed 7 lbs. 3 oz. She states that it was an easy pregnancy and her history of supraventricular tachycardia was not a problem. She states that she was treated several times when symptomatic. There were times when her heart was beating irregularly and rapid. In 2015 she had a cardiac ablation at the [facility]. Her symptoms usually occur with pregnancy and involve a feeling of rapid heart rate, racing, and at times skipping a beat. She states that she does not get lightheaded. She recently states saw a cardiologist who believes that she had Anson-Parkinson-White syndrome. The cardiologist felt that she was stable and no treatment was needed at this time. She describes ONLY 1 recent situation where her heart stopped. She felt lightheaded and dizzy for about 10 seconds. Her heart started again and she has not had any further episodes of this. She had an appointment with electrophysiologist at the [facility] clinic and the physician (Dr. Reid Baxter) felt that she could have a vaginal delivery and no treatment was needed. She also has a history of depression and stress. She is currently seeing a counselor for this. No medications are needed at this time. PHYSICAL EXAMINATION:  /71   Pulse 94   Wt 203 lb 4 oz (92.2 kg)   LMP 08/11/2018 (Approximate)   Body mass index is 32.81 kg/m². General Appearance: Healthy looking, alert, no acute distress. Eyes: No pallor, no icterus, no photophobia. Abdomen: Soft, non-tender.    Extremities: No pretibial pitting edema  Pelvic: deferred  Urine dipstick:  Results for POC orders placed in visit on 04/18/19   POCT urine qual dipstick protein   Result Value Ref Range    Protein, UA Negative Negative   POCT urine qual dipstick glucose   Result Value Ref Range    Glucose, UA POC negative      An ultrasound evaluation was done in our office today. Please refer to the enclosed copy of the ultrasound report for further information. Lab Work Review:  reviewed    IMPRESSION:  1) Single intrauterine gestation at 28 6/7 weeks with fetal biometry consistent with dates. 2) The fetal anatomy appears normal and the fetal lie is vertex. 3) The amniotic fluid volume is WNL (CHEYANNE 12.3) and the placenta is anterior, left lateral.  4) The patient has supraventricular tachycardia and Kim Parkinson White syndrome. 5) Father of baby with hemachromatosis. 6) History of depression. 7) History of endometriosois  8) Multiple abdominal and back pains. 9) Vaginal spotting has stopped. RECOMMENDATIONS:  Each of the recommendations were discussed with the patient:  1) Single intrauterine gestation at 32 10/10 weeks with fetal biometry consistent with dates   2) Normal fetal anatomy and amniotic fluid volume is WNL (CHEYANNE-12.3). 3) VERTEX lie. 4) The patient has supraventricular tachycardia and Kim Parkinson White syndrome. She she appears to be stable at this time. 5) Father of baby with hemachromatosis. 6) History of depression. She is currently seeing a counselor and is doing well. 7) History of endometriosois  8) Occasioanl low abdominal and back pains that seem to come and go. 9) Vaginal spotting has stopped. 10) I suggested the patient continue daily fetal movement kick counts until delivery and continue oral fluids. No need for rescan at this time unless there is a clinical reason for her to return prior to that time. She is to call if she has any problems or questions prior to her next visit.  Further evaluation and management will be dependent on her clinical presentation and the results of her testing. The patient is to continue to follow with you in your office for ongoing obstetric care.     PLAN:    As noted above or sooner prn. Sincerely,        Sissy Fong MD    I spent 15 minutes of direct contact time with the patient of which greater than 50% of the time was used to  the patient, discuss complications and problems related to her pregnancy, or coordinating her care.  I answered all of her questions to her satisfaction.

## 2019-04-18 NOTE — PROGRESS NOTES
States baby is moving  Denies bleeding, leakage and contractions  States heart palpitations less    Call your obstetrician for:    Bleeding, leakage of fluid, abdominal tenderness, headaches, burred vision or  epigastric pain or decreased fetal movement or increased in urinary frequency.    Call if any questions or concerns

## 2019-04-18 NOTE — PATIENT INSTRUCTIONS
Patient Education        Weeks 34 to 39 of Your Pregnancy: Care Instructions  Your Care Instructions    By now, your baby and your belly have grown quite large. It is almost time to give birth. A full-term pregnancy can deliver between 37 and 42 weeks. Your baby's lungs are almost ready to breathe air. The bones in your baby's head are now firm enough to protect it, but soft enough to move down through the birth canal.  You may feel excited, happy, anxious, or scared. You may wonder how you will know if you are in labor or what to expect during labor. Try to be flexible in your expectations of the birth. Because each birth is different, there is no way to know exactly what childbirth will be like for you. This care sheet will help you know what to expect and how to prepare. This may make your childbirth easier. If you haven't already had the Tdap shot during this pregnancy, talk to your doctor about getting it. It will help protect your  against pertussis infection. In the 36th week, most women have a test for group B streptococcus (GBS). GBS is a common bacteria that can live in the vagina and rectum. It can make your baby sick after birth. If you test positive, you will get antibiotics during labor. The medicine will keep your baby from getting the bacteria. Follow-up care is a key part of your treatment and safety. Be sure to make and go to all appointments, and call your doctor if you are having problems. It's also a good idea to know your test results and keep a list of the medicines you take. How can you care for yourself at home? Learn about pain relief choices  · Pain is different for every woman. Talk with your doctor about your feelings about pain. · You can choose from several types of pain relief. These include medicine or breathing techniques, as well as comfort measures. You can use more than one option.   · If you choose to have pain medicine during labor, talk to your doctor about your options. Some medicines lower anxiety and help with some of the pain. Others make your lower body numb so that you won't feel pain. · Be sure to tell your doctor about your pain medicine choice before you start labor or very early in your labor. You may be able to change your mind as labor progresses. · Rarely, a woman is put to sleep by medicine given through a mask or an IV. Labor and delivery  · The first stage of labor has three parts: early, active, and transition. ? Most women have early labor at home. You can stay busy or rest, eat light snacks, drink clear fluids, and start counting contractions. ? When talking during a contraction gets hard, you may be moving to active labor. During active labor, you should head for the hospital if you are not there already. ? You are in active labor when contractions come every 3 to 4 minutes and last about 60 seconds. Your cervix is opening more rapidly. ? If your water breaks, contractions will come faster and stronger. ? During transition, your cervix is stretching, and contractions are coming more rapidly. ? You may want to push, but your cervix might not be ready. Your doctor will tell you when to push. · The second stage starts when your cervix is completely opened and you are ready to push. ? Contractions are very strong to push the baby down the birth canal.  ? You will feel the urge to push. You may feel like you need to have a bowel movement. ? You may be coached to push with contractions. These contractions will be very strong, but you will not have them as often. You can get a little rest between contractions. ? You may be emotional and irritable. You may not be aware of what is going on around you.  ? One last push, and your baby is born. · The third stage is when a few more contractions push out the placenta. This may take 30 minutes or less. · The fourth stage is the welcome recovery.  You may feel overwhelmed with emotions and exhausted but alert. This is a good time to start breastfeeding. Where can you learn more? Go to https://chpepiceweb.healthDel Taco. org and sign in to your [employer] account. Enter K500 in the [facility] box to learn more about \"Weeks 34 to 36 of Your Pregnancy: Care Instructions. \"     If you do not have an account, please click on the \"Sign Up Now\" link. Current as of: September 5, 2018  Content Version: 11.9  © 4432-8937 [employer]. Care instructions adapted under license by [facility] ([facility]). If you have questions about a medical condition or this instruction, always ask your healthcare professional. [place] [address] any warranty or liability for your use of this information. Patient Education        Learning About When to Call Your Doctor During Pregnancy (After 20 Weeks)  Your Care Instructions  It's common to have concerns about what might be a problem during pregnancy. Although most pregnant women don't have any serious problems, it's important to know when to call your doctor if you have certain symptoms or signs of labor. These are general suggestions. Your doctor may give you some more information about when to call. When to call your doctor (after 20 weeks)  Call 911 anytime you think you may need emergency care. For example, call if:  · You have severe vaginal bleeding. · You have sudden, severe pain in your belly. · You passed out (lost consciousness). · You have a seizure. · You see or feel the umbilical cord. · You think you are about to deliver your baby and can't make it safely to the hospital.  Call your doctor now or seek immediate medical care if:  · You have vaginal bleeding. · You have belly pain. · You have a fever. · You have symptoms of preeclampsia, such as:  ? Sudden swelling of your face, hands, or feet. ? New vision problems (such as dimness or blurring). ? A severe headache. · You have a sudden release of fluid from your vagina.  (You think your water broke.)  · You think that you may be in labor. This means that you've had at least 4 contractions within 20 minutes or at least 8 contractions in an hour. · You notice that your baby has stopped moving or is moving much less than normal.  · You have symptoms of a urinary tract infection. These may include:  ? Pain or burning when you urinate. ? A frequent need to urinate without being able to pass much urine. ? Pain in the flank, which is just below the rib cage and above the waist on either side of the back. ? Blood in your urine. Watch closely for changes in your health, and be sure to contact your doctor if:  · You have vaginal discharge that smells bad. · You have skin changes, such as:  ? A rash. ? Itching. ? Yellow color to your skin. · You have other concerns about your pregnancy. If you have labor signs at 37 weeks or more  If you have signs of labor at 37 weeks or more, your doctor may tell you to call when your labor becomes more active. Symptoms of active labor include:  · Contractions that are regular. · Contractions that are less than 5 minutes apart. · Contractions that are hard to talk through. Follow-up care is a key part of your treatment and safety. Be sure to make and go to all appointments, and call your doctor if you are having problems. It's also a good idea to know your test results and keep a list of the medicines you take. Where can you learn more? Go to https://Telanetixmani.healthAventine Renewable Energy Holdings. org and sign in to your [employer] account. Enter  in the [facility] box to learn more about \"Learning About When to Call Your Doctor During Pregnancy (After 20 Weeks). \"     If you do not have an account, please click on the \"Sign Up Now\" link. Current as of: September 5, 2018  Content Version: 11.9  © 1348-4909 [employer]. Care instructions adapted under license by [facility] ([facility]).  If you have questions about a medical condition or this instruction, always ask your healthcare professional. [address] any warranty or liability for your use of this information. Patient Education        Counting Your Baby's Kicks: Care Instructions  Your Care Instructions    Counting your baby's kicks is one way your doctor can tell that your baby is healthy. Most women--especially in a first pregnancy--feel their baby move for the first time between 16 and 22 weeks. The movement may feel like flutters rather than kicks. Your baby may move more at certain times of the day. When you are active, you may notice less kicking than when you are resting. At your prenatal visits, your doctor will ask whether the baby is active. In your last trimester, your doctor may ask you to count the number of times you feel your baby move. Follow-up care is a key part of your treatment and safety. Be sure to make and go to all appointments, and call your doctor if you are having problems. It's also a good idea to know your test results and keep a list of the medicines you take. How do you count fetal kicks? · A common method of checking your baby's movement is to count the number of kicks or moves you feel in 1 hour. Ten movements (such as kicks, flutters, or rolls) in 1 hour are normal. Some doctors suggest that you count in the morning until you get to 10 movements. Then you can quit for that day and start again the next day. · Pick your baby's most active time of day to count. This may be any time from morning to evening. · If you do not feel 10 movements in an hour, your baby may be sleeping. Wait for the next hour and count again. When should you call for help? Call your doctor now or seek immediate medical care if:    · You noticed that your baby has stopped moving or is moving much less than normal.    Watch closely for changes in your health, and be sure to contact your doctor if you have any problems. Where can you learn more?   Go to

## 2019-05-06 ENCOUNTER — HOSPITAL ENCOUNTER (OUTPATIENT)
Age: 24
Discharge: HOME | End: 2019-05-06
Attending: LEGAL MEDICINE | Admitting: LEGAL MEDICINE
Payer: COMMERCIAL

## 2019-05-06 ENCOUNTER — APPOINTMENT (OUTPATIENT)
Dept: ULTRASOUND IMAGING | Age: 24
End: 2019-05-06
Payer: COMMERCIAL

## 2019-05-06 VITALS
TEMPERATURE: 98.3 F | SYSTOLIC BLOOD PRESSURE: 109 MMHG | HEART RATE: 105 BPM | RESPIRATION RATE: 18 BRPM | DIASTOLIC BLOOD PRESSURE: 68 MMHG

## 2019-05-06 PROBLEM — Z3A.38 38 WEEKS GESTATION OF PREGNANCY: Status: ACTIVE | Noted: 2019-05-06

## 2019-05-06 LAB
BILIRUBIN URINE: NEGATIVE
BLOOD, URINE: NEGATIVE
CLARITY: CLEAR
COLOR: YELLOW
GLUCOSE URINE: NEGATIVE MG/DL
KETONES, URINE: NEGATIVE MG/DL
LEUKOCYTE ESTERASE, URINE: NEGATIVE
NITRITE, URINE: NEGATIVE
PH UA: 6 (ref 5–9)
PROTEIN UA: NEGATIVE MG/DL
SPECIFIC GRAVITY UA: 1.01 (ref 1–1.03)
UROBILINOGEN, URINE: 0.2 E.U./DL

## 2019-05-06 PROCEDURE — 99211 OFF/OP EST MAY X REQ PHY/QHP: CPT

## 2019-05-06 PROCEDURE — 76819 FETAL BIOPHYS PROFIL W/O NST: CPT

## 2019-05-06 PROCEDURE — 84112 EVAL AMNIOTIC FLUID PROTEIN: CPT

## 2019-05-06 PROCEDURE — 81003 URINALYSIS AUTO W/O SCOPE: CPT

## 2019-05-06 RX ORDER — ACYCLOVIR 400 MG/1
400 TABLET ORAL
COMMUNITY

## 2019-05-06 NOTE — PROGRESS NOTES
Spoke to Dr. Missael Leung to notify her of BPP results and urinalysis.  Pt OK to be discharged per Dr. Missael Leung

## 2019-05-06 NOTE — PROGRESS NOTES
ambulatory to unit with complaint of lof since about 10 am, pt states it was a small amount of clear fluid, pt did not have to wear a panty liner. Maternal perception of fetal movement present, efm applied.  amnisure dfone

## 2019-05-07 NOTE — H&P
[address]                              HISTORY AND PHYSICAL    PATIENT NAME: Gabby Son                    :        1995  MED REC NO:   [id number]                            ROOM:       LD05  ACCOUNT NO:   [de-identified]                           ADMIT DATE: 2019. PROVIDER:     David Harris MD    HISTORY OF PRESENT ILLNESS:   68-year-old white female  2, para  1, presented on 2019 to L and D with complaints of leaking fluid  and cramping. No vaginal bleeding. No change in fetal movements. No  change in her bowel or urinary habits. For her past medical, surgical, GYN, social, and family history; please  refer to ACOG forms A and B. REVIEW OF SYSTEMS:  Negative for cardiac, respiratory, GI, ,  neurologic, psychiatric, ENT, integument, hematologic, lymphatic, or  constitutional abnormalities. PHYSICAL EXAMINATION:  VITAL SIGNS:  Stable. Blood pressure 109/68, pulse 105. PELVIC:  Fetal heart tones are present in the 140s with [id number]-ji-ejml  variability present. Accelerations noted. AmniSure was negative. Cervix was closed and long. LABORATORY DATA:  UA was negative. Biophysical profile was 8/8 with  amniotic fluid index of 10.2. ASSESSMENT:  Intrauterine pregnancy at 38-1/2 weeks with no evidence of  ruptured membrane or labor. She felt ready to go home. PLAN:  Home with fetal movement and labor precautions. She is to keep  her followup appointment at the office. She indicated understanding of  all instructions.         Néstor Matias MD    D: 2019 8:33:37       T: 2019 9:36:54     [email or web address]  Job#: [id number]     Doc#: [id number]    CC:

## 2019-05-13 ENCOUNTER — ANESTHESIA EVENT (OUTPATIENT)
Dept: LABOR AND DELIVERY | Age: 24
End: 2019-05-13
Payer: COMMERCIAL

## 2019-05-13 ENCOUNTER — HOSPITAL ENCOUNTER (INPATIENT)
Age: 24
LOS: 2 days | Discharge: HOME | End: 2019-05-15
Attending: LEGAL MEDICINE | Admitting: LEGAL MEDICINE
Payer: COMMERCIAL

## 2019-05-13 ENCOUNTER — ANESTHESIA (OUTPATIENT)
Dept: LABOR AND DELIVERY | Age: 24
End: 2019-05-13
Payer: COMMERCIAL

## 2019-05-13 LAB
ABO/RH: NORMAL
ALBUMIN SERPL-MCNC: 3.7 G/DL (ref 3.5–5.2)
ALP BLD-CCNC: 191 U/L (ref 35–104)
ALT SERPL-CCNC: 23 U/L (ref 0–32)
AMPHETAMINE SCREEN, URINE: NOT DETECTED
ANION GAP SERPL CALCULATED.3IONS-SCNC: 13 MMOL/L (ref 7–16)
ANTIBODY SCREEN: NORMAL
AST SERPL-CCNC: 28 U/L (ref 0–31)
BARBITURATE SCREEN URINE: NOT DETECTED
BENZODIAZEPINE SCREEN, URINE: NOT DETECTED
BILIRUB SERPL-MCNC: 0.3 MG/DL (ref 0–1.2)
BUN BLDV-MCNC: 6 MG/DL (ref 6–20)
CALCIUM SERPL-MCNC: 9.2 MG/DL (ref 8.6–10.2)
CANNABINOID SCREEN URINE: NOT DETECTED
CHLORIDE BLD-SCNC: 101 MMOL/L (ref 98–107)
CO2: 21 MMOL/L (ref 22–29)
COCAINE METABOLITE SCREEN URINE: NOT DETECTED
CREAT SERPL-MCNC: 0.4 MG/DL (ref 0.5–1)
GFR AFRICAN AMERICAN: >60
GFR NON-AFRICAN AMERICAN: >60 ML/MIN/1.73
GLUCOSE BLD-MCNC: 102 MG/DL (ref 74–99)
HCT VFR BLD CALC: 36.2 % (ref 34–48)
HEMOGLOBIN: 11.8 G/DL (ref 11.5–15.5)
MCH RBC QN AUTO: 28.2 PG (ref 26–35)
MCHC RBC AUTO-ENTMCNC: 32.6 % (ref 32–34.5)
MCV RBC AUTO: 86.4 FL (ref 80–99.9)
METHADONE SCREEN, URINE: NOT DETECTED
OPIATE SCREEN URINE: NOT DETECTED
PDW BLD-RTO: 16.5 FL (ref 11.5–15)
PHENCYCLIDINE SCREEN URINE: NOT DETECTED
PLATELET # BLD: 200 E9/L (ref 130–450)
PMV BLD AUTO: 11 FL (ref 7–12)
POTASSIUM SERPL-SCNC: 3.9 MMOL/L (ref 3.5–5)
PROPOXYPHENE SCREEN: NOT DETECTED
RBC # BLD: 4.19 E12/L (ref 3.5–5.5)
SODIUM BLD-SCNC: 135 MMOL/L (ref 132–146)
TOTAL PROTEIN: 6.4 G/DL (ref 6.4–8.3)
WBC # BLD: 11.5 E9/L (ref 4.5–11.5)

## 2019-05-13 PROCEDURE — 0KQM0ZZ REPAIR PERINEUM MUSCLE, OPEN APPROACH: ICD-10-PCS | Performed by: LEGAL MEDICINE

## 2019-05-13 PROCEDURE — 3700000025 EPIDURAL BLOCK: Performed by: ANESTHESIOLOGY

## 2019-05-13 PROCEDURE — 84112 EVAL AMNIOTIC FLUID PROTEIN: CPT

## 2019-05-13 PROCEDURE — 85027 COMPLETE CBC AUTOMATED: CPT

## 2019-05-13 PROCEDURE — 80053 COMPREHEN METABOLIC PANEL: CPT

## 2019-05-13 PROCEDURE — 2500000003 HC RX 250 WO HCPCS: Performed by: ANESTHESIOLOGY

## 2019-05-13 PROCEDURE — 86850 RBC ANTIBODY SCREEN: CPT

## 2019-05-13 PROCEDURE — 86901 BLOOD TYPING SEROLOGIC RH(D): CPT

## 2019-05-13 PROCEDURE — 86900 BLOOD TYPING SEROLOGIC ABO: CPT

## 2019-05-13 PROCEDURE — 80307 DRUG TEST PRSMV CHEM ANLYZR: CPT

## 2019-05-13 PROCEDURE — 1220000001 HC SEMI PRIVATE L&D R&B

## 2019-05-13 PROCEDURE — 2580000003 HC RX 258: Performed by: LEGAL MEDICINE

## 2019-05-13 PROCEDURE — 36415 COLL VENOUS BLD VENIPUNCTURE: CPT

## 2019-05-13 RX ORDER — SODIUM CHLORIDE 0.9 % (FLUSH) 0.9 %
10 SYRINGE (ML) INJECTION PRN
Status: DISCONTINUED | OUTPATIENT
Start: 2019-05-13 | End: 2019-05-14

## 2019-05-13 RX ORDER — ONDANSETRON 2 MG/ML
4 INJECTION INTRAMUSCULAR; INTRAVENOUS EVERY 6 HOURS PRN
Status: DISCONTINUED | OUTPATIENT
Start: 2019-05-13 | End: 2019-05-13 | Stop reason: SDUPTHER

## 2019-05-13 RX ORDER — SODIUM CHLORIDE, SODIUM LACTATE, POTASSIUM CHLORIDE, CALCIUM CHLORIDE 600; 310; 30; 20 MG/100ML; MG/100ML; MG/100ML; MG/100ML
INJECTION, SOLUTION INTRAVENOUS CONTINUOUS
Status: DISCONTINUED | OUTPATIENT
Start: 2019-05-13 | End: 2019-05-14

## 2019-05-13 RX ORDER — EPHEDRINE SULFATE/0.9% NACL/PF 50 MG/5 ML
10 SYRINGE (ML) INTRAVENOUS EVERY 5 MIN PRN
Status: DISCONTINUED | OUTPATIENT
Start: 2019-05-13 | End: 2019-05-14

## 2019-05-13 RX ORDER — LIDOCAINE HYDROCHLORIDE 10 MG/ML
30 INJECTION, SOLUTION EPIDURAL; INFILTRATION; INTRACAUDAL; PERINEURAL PRN
Status: DISCONTINUED | OUTPATIENT
Start: 2019-05-13 | End: 2019-05-14

## 2019-05-13 RX ORDER — ONDANSETRON 2 MG/ML
4 INJECTION INTRAMUSCULAR; INTRAVENOUS EVERY 6 HOURS PRN
Status: DISCONTINUED | OUTPATIENT
Start: 2019-05-13 | End: 2019-05-14

## 2019-05-13 RX ORDER — NALOXONE HYDROCHLORIDE 0.4 MG/ML
0.4 INJECTION, SOLUTION INTRAMUSCULAR; INTRAVENOUS; SUBCUTANEOUS PRN
Status: DISCONTINUED | OUTPATIENT
Start: 2019-05-13 | End: 2019-05-14

## 2019-05-13 RX ORDER — NALBUPHINE HCL 10 MG/ML
5 AMPUL (ML) INJECTION EVERY 4 HOURS PRN
Status: DISCONTINUED | OUTPATIENT
Start: 2019-05-13 | End: 2019-05-14

## 2019-05-13 RX ORDER — SODIUM CHLORIDE, SODIUM LACTATE, POTASSIUM CHLORIDE, AND CALCIUM CHLORIDE .6; .31; .03; .02 G/100ML; G/100ML; G/100ML; G/100ML
500 INJECTION, SOLUTION INTRAVENOUS ONCE
Status: DISCONTINUED | OUTPATIENT
Start: 2019-05-13 | End: 2019-05-14

## 2019-05-13 RX ORDER — SODIUM CHLORIDE 0.9 % (FLUSH) 0.9 %
10 SYRINGE (ML) INJECTION EVERY 12 HOURS SCHEDULED
Status: DISCONTINUED | OUTPATIENT
Start: 2019-05-13 | End: 2019-05-14

## 2019-05-13 RX ORDER — SODIUM CHLORIDE, SODIUM LACTATE, POTASSIUM CHLORIDE, CALCIUM CHLORIDE 600; 310; 30; 20 MG/100ML; MG/100ML; MG/100ML; MG/100ML
500 INJECTION, SOLUTION INTRAVENOUS
Status: ACTIVE | OUTPATIENT
Start: 2019-05-13 | End: 2019-05-13

## 2019-05-13 RX ADMIN — Medication 15 ML/HR: at 23:58

## 2019-05-13 RX ADMIN — SODIUM CHLORIDE, POTASSIUM CHLORIDE, SODIUM LACTATE AND CALCIUM CHLORIDE: 600; 310; 30; 20 INJECTION, SOLUTION INTRAVENOUS at 22:00

## 2019-05-13 RX ADMIN — Medication 15 ML: at 23:48

## 2019-05-13 ASSESSMENT — PAIN DESCRIPTION - LOCATION: LOCATION: ABDOMEN;BACK

## 2019-05-13 ASSESSMENT — PAIN SCALES - GENERAL: PAINLEVEL_OUTOF10: 4

## 2019-05-13 ASSESSMENT — PAIN DESCRIPTION - DESCRIPTORS: DESCRIPTORS: CRAMPING;PRESSURE;SORE

## 2019-05-13 ASSESSMENT — PAIN DESCRIPTION - FREQUENCY: FREQUENCY: INTERMITTENT

## 2019-05-13 ASSESSMENT — PAIN DESCRIPTION - PROGRESSION: CLINICAL_PROGRESSION: GRADUALLY WORSENING

## 2019-05-13 ASSESSMENT — PAIN DESCRIPTION - ORIENTATION: ORIENTATION: LOWER

## 2019-05-13 ASSESSMENT — PAIN DESCRIPTION - PAIN TYPE: TYPE: ACUTE PAIN

## 2019-05-13 ASSESSMENT — PAIN - FUNCTIONAL ASSESSMENT: PAIN_FUNCTIONAL_ASSESSMENT: ACTIVITIES ARE NOT PREVENTED

## 2019-05-13 ASSESSMENT — PAIN DESCRIPTION - ONSET: ONSET: ON-GOING

## 2019-05-14 LAB
HCT VFR BLD CALC: 35.2 % (ref 34–48)
HEMOGLOBIN: 11.2 G/DL (ref 11.5–15.5)
MCH RBC QN AUTO: 27.9 PG (ref 26–35)
MCHC RBC AUTO-ENTMCNC: 31.8 % (ref 32–34.5)
MCV RBC AUTO: 87.8 FL (ref 80–99.9)
PDW BLD-RTO: 16.3 FL (ref 11.5–15)
PLATELET # BLD: 191 E9/L (ref 130–450)
PMV BLD AUTO: 11.3 FL (ref 7–12)
RBC # BLD: 4.01 E12/L (ref 3.5–5.5)
WBC # BLD: 13.2 E9/L (ref 4.5–11.5)

## 2019-05-14 PROCEDURE — 2500000003 HC RX 250 WO HCPCS: Performed by: LEGAL MEDICINE

## 2019-05-14 PROCEDURE — 1220000001 HC SEMI PRIVATE L&D R&B

## 2019-05-14 PROCEDURE — 2580000003 HC RX 258: Performed by: LEGAL MEDICINE

## 2019-05-14 PROCEDURE — 7200000001 HC VAGINAL DELIVERY

## 2019-05-14 PROCEDURE — 6370000000 HC RX 637 (ALT 250 FOR IP): Performed by: LEGAL MEDICINE

## 2019-05-14 PROCEDURE — 6360000002 HC RX W HCPCS: Performed by: LEGAL MEDICINE

## 2019-05-14 PROCEDURE — 88307 TISSUE EXAM BY PATHOLOGIST: CPT

## 2019-05-14 PROCEDURE — 36415 COLL VENOUS BLD VENIPUNCTURE: CPT

## 2019-05-14 PROCEDURE — 2500000003 HC RX 250 WO HCPCS: Performed by: NURSE ANESTHETIST, CERTIFIED REGISTERED

## 2019-05-14 PROCEDURE — 85027 COMPLETE CBC AUTOMATED: CPT

## 2019-05-14 RX ORDER — CARBOPROST TROMETHAMINE 250 UG/ML
250 INJECTION, SOLUTION INTRAMUSCULAR ONCE
Status: COMPLETED | OUTPATIENT
Start: 2019-05-14 | End: 2019-05-14

## 2019-05-14 RX ORDER — LANOLIN 100 %
OINTMENT (GRAM) TOPICAL PRN
Status: DISCONTINUED | OUTPATIENT
Start: 2019-05-14 | End: 2019-05-15 | Stop reason: HOSPADM

## 2019-05-14 RX ORDER — FERROUS SULFATE 325(65) MG
325 TABLET ORAL
Status: DISCONTINUED | OUTPATIENT
Start: 2019-05-14 | End: 2019-05-15 | Stop reason: HOSPADM

## 2019-05-14 RX ORDER — CARBOPROST TROMETHAMINE 250 UG/ML
250 INJECTION, SOLUTION INTRAMUSCULAR
Status: ACTIVE | OUTPATIENT
Start: 2019-05-14 | End: 2019-05-14

## 2019-05-14 RX ORDER — ACYCLOVIR 200 MG/1
400 CAPSULE ORAL
Status: DISCONTINUED | OUTPATIENT
Start: 2019-05-14 | End: 2019-05-15 | Stop reason: HOSPADM

## 2019-05-14 RX ORDER — ACYCLOVIR 400 MG/1
400 TABLET ORAL
Status: DISCONTINUED | OUTPATIENT
Start: 2019-05-14 | End: 2019-05-14 | Stop reason: SDUPTHER

## 2019-05-14 RX ORDER — SODIUM CHLORIDE 0.9 % (FLUSH) 0.9 %
10 SYRINGE (ML) INJECTION EVERY 12 HOURS SCHEDULED
Status: DISCONTINUED | OUTPATIENT
Start: 2019-05-14 | End: 2019-05-15 | Stop reason: HOSPADM

## 2019-05-14 RX ORDER — IBUPROFEN 400 MG/1
400 TABLET ORAL EVERY 6 HOURS PRN
Status: DISCONTINUED | OUTPATIENT
Start: 2019-05-14 | End: 2019-05-15 | Stop reason: HOSPADM

## 2019-05-14 RX ORDER — OXYCODONE HYDROCHLORIDE AND ACETAMINOPHEN 5; 325 MG/1; MG/1
1 TABLET ORAL EVERY 4 HOURS PRN
Status: DISCONTINUED | OUTPATIENT
Start: 2019-05-14 | End: 2019-05-15 | Stop reason: HOSPADM

## 2019-05-14 RX ORDER — ONDANSETRON 2 MG/ML
INJECTION INTRAMUSCULAR; INTRAVENOUS
Status: DISPENSED
Start: 2019-05-14 | End: 2019-05-14

## 2019-05-14 RX ORDER — EPHEDRINE SULFATE 50 MG/ML
INJECTION INTRAVENOUS PRN
Status: DISCONTINUED | OUTPATIENT
Start: 2019-05-14 | End: 2019-05-14 | Stop reason: SDUPTHER

## 2019-05-14 RX ORDER — OXYCODONE HYDROCHLORIDE AND ACETAMINOPHEN 5; 325 MG/1; MG/1
2 TABLET ORAL EVERY 4 HOURS PRN
Status: DISCONTINUED | OUTPATIENT
Start: 2019-05-14 | End: 2019-05-15 | Stop reason: HOSPADM

## 2019-05-14 RX ORDER — DOCUSATE SODIUM 100 MG/1
100 CAPSULE, LIQUID FILLED ORAL 2 TIMES DAILY
Status: DISCONTINUED | OUTPATIENT
Start: 2019-05-14 | End: 2019-05-15 | Stop reason: HOSPADM

## 2019-05-14 RX ORDER — SODIUM CHLORIDE 0.9 % (FLUSH) 0.9 %
10 SYRINGE (ML) INJECTION PRN
Status: DISCONTINUED | OUTPATIENT
Start: 2019-05-14 | End: 2019-05-15 | Stop reason: HOSPADM

## 2019-05-14 RX ORDER — ONDANSETRON 2 MG/ML
4 INJECTION INTRAMUSCULAR; INTRAVENOUS EVERY 6 HOURS PRN
Status: DISCONTINUED | OUTPATIENT
Start: 2019-05-14 | End: 2019-05-15 | Stop reason: HOSPADM

## 2019-05-14 RX ORDER — METHYLERGONOVINE MALEATE 0.2 MG/ML
200 INJECTION INTRAVENOUS
Status: ACTIVE | OUTPATIENT
Start: 2019-05-14 | End: 2019-05-14

## 2019-05-14 RX ORDER — ACETAMINOPHEN 325 MG/1
650 TABLET ORAL EVERY 4 HOURS PRN
Status: DISCONTINUED | OUTPATIENT
Start: 2019-05-14 | End: 2019-05-15 | Stop reason: HOSPADM

## 2019-05-14 RX ORDER — METHYLERGONOVINE MALEATE 0.2 MG/ML
200 INJECTION INTRAVENOUS ONCE
Status: DISCONTINUED | OUTPATIENT
Start: 2019-05-14 | End: 2019-05-14

## 2019-05-14 RX ORDER — SODIUM CHLORIDE, SODIUM LACTATE, POTASSIUM CHLORIDE, CALCIUM CHLORIDE 600; 310; 30; 20 MG/100ML; MG/100ML; MG/100ML; MG/100ML
INJECTION, SOLUTION INTRAVENOUS CONTINUOUS
Status: DISCONTINUED | OUTPATIENT
Start: 2019-05-14 | End: 2019-05-15 | Stop reason: HOSPADM

## 2019-05-14 RX ADMIN — ACYCLOVIR 400 MG: 200 CAPSULE ORAL at 14:00

## 2019-05-14 RX ADMIN — EPHEDRINE SULFATE 5 MG: 50 INJECTION, SOLUTION INTRAVENOUS at 01:21

## 2019-05-14 RX ADMIN — Medication 10 ML: at 08:30

## 2019-05-14 RX ADMIN — OXYCODONE HYDROCHLORIDE AND ACETAMINOPHEN 1 TABLET: 5; 325 TABLET ORAL at 06:28

## 2019-05-14 RX ADMIN — OXYCODONE HYDROCHLORIDE AND ACETAMINOPHEN 1 TABLET: 5; 325 TABLET ORAL at 15:56

## 2019-05-14 RX ADMIN — Medication 999 MILLI-UNITS/MIN: at 05:44

## 2019-05-14 RX ADMIN — ACYCLOVIR 400 MG: 200 CAPSULE ORAL at 18:46

## 2019-05-14 RX ADMIN — BENZOCAINE AND LEVOMENTHOL: 200; 5 SPRAY TOPICAL at 06:28

## 2019-05-14 RX ADMIN — DOCUSATE SODIUM 100 MG: 100 CAPSULE, LIQUID FILLED ORAL at 08:29

## 2019-05-14 RX ADMIN — OXYCODONE HYDROCHLORIDE AND ACETAMINOPHEN 1 TABLET: 5; 325 TABLET ORAL at 20:06

## 2019-05-14 RX ADMIN — IBUPROFEN 400 MG: 400 TABLET, FILM COATED ORAL at 09:15

## 2019-05-14 RX ADMIN — OXYCODONE HYDROCHLORIDE AND ACETAMINOPHEN 1 TABLET: 5; 325 TABLET ORAL at 11:12

## 2019-05-14 RX ADMIN — CARBOPROST TROMETHAMINE 250 MCG: 250 INJECTION, SOLUTION INTRAMUSCULAR at 05:36

## 2019-05-14 RX ADMIN — SODIUM CHLORIDE, POTASSIUM CHLORIDE, SODIUM LACTATE AND CALCIUM CHLORIDE: 600; 310; 30; 20 INJECTION, SOLUTION INTRAVENOUS at 01:32

## 2019-05-14 RX ADMIN — SODIUM CHLORIDE, POTASSIUM CHLORIDE, SODIUM LACTATE AND CALCIUM CHLORIDE: 600; 310; 30; 20 INJECTION, SOLUTION INTRAVENOUS at 00:10

## 2019-05-14 RX ADMIN — DOCUSATE SODIUM 100 MG: 100 CAPSULE, LIQUID FILLED ORAL at 20:05

## 2019-05-14 RX ADMIN — WITCH HAZEL 40 EACH: 500 SOLUTION RECTAL; TOPICAL at 06:28

## 2019-05-14 RX ADMIN — Medication: at 06:28

## 2019-05-14 RX ADMIN — ACYCLOVIR 400 MG: 200 CAPSULE ORAL at 22:54

## 2019-05-14 RX ADMIN — ACYCLOVIR 400 MG: 200 CAPSULE ORAL at 08:29

## 2019-05-14 RX ADMIN — IBUPROFEN 400 MG: 400 TABLET, FILM COATED ORAL at 22:54

## 2019-05-14 RX ADMIN — IBUPROFEN 400 MG: 400 TABLET, FILM COATED ORAL at 15:15

## 2019-05-14 RX ADMIN — Medication 10 ML: at 08:29

## 2019-05-14 RX ADMIN — ONDANSETRON 4 MG: 2 INJECTION INTRAMUSCULAR; INTRAVENOUS at 06:41

## 2019-05-14 ASSESSMENT — PAIN SCALES - GENERAL
PAINLEVEL_OUTOF10: 6
PAINLEVEL_OUTOF10: 3
PAINLEVEL_OUTOF10: 4
PAINLEVEL_OUTOF10: 1
PAINLEVEL_OUTOF10: 4
PAINLEVEL_OUTOF10: 4
PAINLEVEL_OUTOF10: 0
PAINLEVEL_OUTOF10: 4
PAINLEVEL_OUTOF10: 9

## 2019-05-14 NOTE — PROGRESS NOTES
Skin to skin stimulation initiated between mother and baby. Baby alert, color pink, respirations regular. Mother instructed on safe skin to skin care practices with proper positioning of baby and assurance of unobstructed airway- verbalized understanding.

## 2019-05-14 NOTE — PROGRESS NOTES
Dr. Silvia Pinon arrives at bedside, examination done. Dr. Edilia Locke saturated pads and she remains at bedside during pushing efforts. No new orders received.

## 2019-05-14 NOTE — H&P
Shoulder dystocia and birth trauma have been  reviewed with her. All her questions have been answered and she wishes  to proceed with an attempt at vaginal delivery.         Li Boone MD    D: 05/14/2019 7:13:36       T: 05/14/2019 8:18:07     [email or web address]  Job#: [id number]     Doc#: [id number]    CC:

## 2019-05-14 NOTE — PROGRESS NOTES
Assumed care of pt. Pt wants to shower. Instructed approx 30 minutes left for recovery and then we can get everything done. Pt agreeable.

## 2019-05-14 NOTE — PROGRESS NOTES
RN back to bedside, asked charge RN to please come to bedside during pushing efforts. RN's x2 remain at bedside. FHT/UC's being monitored continuously during pushing efforts.

## 2019-05-14 NOTE — PROGRESS NOTES
Called to update Dr. Arminda Roberts, reported that patient is having irregular coupling contractions followed by possible late deceleration or a changing baseline. Reported last acceleration at 0235, change of position and LR bolus that I initiated. Orders received to place FSE, if possible to put her in knee chest and place 02 on via non-re breather. Call back to update in 30 minutes after orders carried out.

## 2019-05-14 NOTE — PLAN OF CARE
Mom has electric breast pump at home. New kit given. Denies questions at present. Encouraged to call with questions or concerns. Ericka Sanders.

## 2019-05-14 NOTE — ANESTHESIA PRE PROCEDURE
mg  0.4 mg Intravenous PRN Dorinda Funk MD        nalbuphine (NUBAIN) injection 5 mg  5 mg Intravenous Q4H PRN Dorinda Funk MD        [facility]) [facility] 4 mg  4 mg Intravenous Q6H PRN Dorinda Funk MD        fentaNYL 1.85mcg/ml and bupivacaine 0.1% 15ml syringe (Home Care) (OB) 15 mL epidural  15 mL Epidural Once Dorinda Funk MD        fentaNYL 1.85mcg/ml and Bupivicaine 0.1% in 0.9% NS 135ml infusion (OB) epidural  15 mL/hr Epidural Continuous Dorinda Funk MD        ePHEDrine injection 10 mg  10 mg Intravenous Q5 Min PRN Dorinda Funk MD        lactated ringers infusion 500 mL  500 mL Intravenous Once PRN Dorinda Funk MD        lactated ringers infusion 500 mL  500 mL Intravenous Once PRN Dorinda Funk MD           Allergies: Allergies   Allergen Reactions    Latex Rash       Problem List:    Patient Active Problem List   Diagnosis Code    Normal labor O80, Z37.9    Pelvic pain in female R10.2    Normal pregnancy in second trimester Z34.92    Personal history of supraventricular tachycardia Z86.79    Anson-Parkinson-White (WPW) syndrome I45.6    Subchorionic hematoma in first trimester O41.8X10, O46.8X1    History of depression Z86.59    Family history of hemochromatosis Z83.49    Bilateral sciatica M54.31, M54.32    Personal history of endometriosis Z87.42    27 weeks gestation of pregnancy Z3A.27    29 weeks gestation of pregnancy Z3A.29    Pregnancy, antepartum, complications C43.88    35 weeks gestation of pregnancy Z3A.35    38 weeks gestation of pregnancy Z3A.38    Normal labor and delivery O80       Past Medical History:        Diagnosis Date    Anemia     had previously    Depression     Endometriosis     june 2018    Heart abnormality     Hx. SVT-ablation in 2015    Herpes simplex virus (HSV) infection     Postpartum depression        Past Surgical History:        Procedure Laterality Date    CARDIAC SURGERY  06/2015

## 2019-05-14 NOTE — PROGRESS NOTES
Department of Anesthesiology  Continuous Labor Epidural Note - Periodic Monitoring Note    Name:  Jyoti Rodriguez                                         Age: 21 y.o. MRN:  [id number]  Obstetrician:  No name on file. Last Pain Score: (Charted in Doc Flowsheet)  Pain Level: 4    BP (!) 99/57   Pulse 102   Temp 37 °C (98.6 °F) (Oral)   Resp 18   Ht 5' 6\" (1.676 m)   Wt 212 lb (96.2 kg)   LMP 08/11/2018 (Approximate)   BMI 34.22 kg/m²     A/P: Jyoti Rodriguez is a 21y.o. year-old at 43w3d with continuous labor epidural and good pain control - Yes. Patient with no complaints of pain. Patient complaining of lightheadness for the past 10 minutes. Patient's vital signs assessed. Patient's blood pressures 99/57, 97/54. Ephedrine 5 mg IV given. Patient tolerated well.  Vital signs stable.;    ZAIRE Montgomery - CRNA5/14/20191:21 AM

## 2019-05-14 NOTE — PROGRESS NOTES
RN to bedside in the presence of question FHT pattern. Patient taken out of high reddy and placed supine to do a cervical exam, upon checking- green chux pad saturated with mixture of amniotic fluid and blood. Cervix examined and noted to be complete at this time, no clots noted on pad or after removing glove. Patient placed back in high fowlers, pad removed, 02 on and LR bolus going. RN leaves room to call Dr. Madai Johnson. Dr. Madai Johnson called, reported the above findings, reported that graph looks as though it is having late decelerations now, with contractions still occurring every 1 minute to 1.5 minutes. Asked that she may come to observe. Orders received, asked what current baseline was- I reported 150-155. Orders to start pushing with patient in knee position and she will be on her way.

## 2019-05-14 NOTE — PROGRESS NOTES
RN to bedside continuously in preparation for epidural.   CRNA at bedside: 2336  Epidural placed : 2348  Bolus: 2348  Test Dose: 2350  Lr placed at [address]    Call light in place before exiting room and significant other/FOB invited back to bedside. Will continue to monitor patient.

## 2019-05-14 NOTE — PROGRESS NOTES
Called to update Dr. Hardin Close again on patients FHT/UC pattern, change of position, placement of FSE and 02. Dr. Clay Conn with current plan of care and graph, orders received to all her when patient is complete with an urge to push.

## 2019-05-14 NOTE — OP NOTE
[address]                                OPERATIVE REPORT    PATIENT NAME: Nae Chen                    :        1995  MED REC NO:   [id number]                            ROOM:       LD08  ACCOUNT NO:   [de-identified]                           ADMIT DATE: 2019. PROVIDER:     Jodi Longoria MD    DATE OF PROCEDURE:  2019    The patient's bleeding began to increase. She was placed in knee-chest  position. She began to push. After an hour of pushing and close  monitoring, the fetal head was now down to +3 station. She was placed  in Theo position. She was able to deliver the fetal head. Fetal  trunk was delivered with minimal traction, applied in an axis parallel  to the fetal spine, after the shoulder had cleared the pubic bone. Nares  and hypopharynx were suctioned. Cord was clamped and cut. Infant was  crying and vigorous. Cord gases and blood samples were obtained. Placenta was removed spontaneously. Note was made of an intact two  artery, one vein cord placenta with approximately 20% abruptio. Fundus  was firm. Estimated blood loss was 500 mL. No cervical or vaginal  lacerations were noted. Repair of second-degree midline laceration was  performed in a running fashion. For bedside exam of the infant, please  refer to pediatrician's notes. The cord arterial gas; pH was 7.260,  base excess -1.9. Cord venous gas; pH 7.329, base excess -2.1. Pediatrician is Sarah Bueno. Mother and infant went to recovery room in  stable condition.         Li Boone MD    D: 2019 7:16:16       T: 2019 8:25:07     [email or web address]  Job#: [id number]     Doc#: [id number]    CC:

## 2019-05-14 NOTE — PROGRESS NOTES
Updated Dr. Dollene Osgood on STAT CBC results at nurses station as well as fundal and bleeding checks thus far in the recovery period. No new orders received.

## 2019-05-15 VITALS
RESPIRATION RATE: 16 BRPM | WEIGHT: 212 LBS | SYSTOLIC BLOOD PRESSURE: 106 MMHG | DIASTOLIC BLOOD PRESSURE: 63 MMHG | HEIGHT: 66 IN | HEART RATE: 86 BPM | TEMPERATURE: 97.9 F | BODY MASS INDEX: 34.07 KG/M2

## 2019-05-15 PROBLEM — Z37.9 NORMAL LABOR: Status: ACTIVE | Noted: 2019-05-15

## 2019-05-15 LAB — HEMOGLOBIN: 10.1 G/DL (ref 11.5–15.5)

## 2019-05-15 PROCEDURE — 6370000000 HC RX 637 (ALT 250 FOR IP): Performed by: LEGAL MEDICINE

## 2019-05-15 PROCEDURE — 90715 TDAP VACCINE 7 YRS/> IM: CPT | Performed by: LEGAL MEDICINE

## 2019-05-15 PROCEDURE — 6360000002 HC RX W HCPCS: Performed by: LEGAL MEDICINE

## 2019-05-15 PROCEDURE — 90471 IMMUNIZATION ADMIN: CPT | Performed by: LEGAL MEDICINE

## 2019-05-15 PROCEDURE — 85018 HEMOGLOBIN: CPT

## 2019-05-15 PROCEDURE — 36415 COLL VENOUS BLD VENIPUNCTURE: CPT

## 2019-05-15 RX ADMIN — ACYCLOVIR 400 MG: 200 CAPSULE ORAL at 14:15

## 2019-05-15 RX ADMIN — OXYCODONE HYDROCHLORIDE AND ACETAMINOPHEN 1 TABLET: 5; 325 TABLET ORAL at 14:16

## 2019-05-15 RX ADMIN — DOCUSATE SODIUM 100 MG: 100 CAPSULE, LIQUID FILLED ORAL at 08:22

## 2019-05-15 RX ADMIN — ACYCLOVIR 400 MG: 200 CAPSULE ORAL at 10:04

## 2019-05-15 RX ADMIN — OXYCODONE HYDROCHLORIDE AND ACETAMINOPHEN 1 TABLET: 5; 325 TABLET ORAL at 03:07

## 2019-05-15 RX ADMIN — ACETAMINOPHEN 650 MG: 325 TABLET, FILM COATED ORAL at 10:04

## 2019-05-15 RX ADMIN — TETANUS TOXOID, REDUCED DIPHTHERIA TOXOID AND ACELLULAR PERTUSSIS VACCINE, ADSORBED 0.5 ML: 5; 2.5; 8; 8; 2.5 SUSPENSION INTRAMUSCULAR at 14:16

## 2019-05-15 RX ADMIN — ACYCLOVIR 400 MG: 200 CAPSULE ORAL at 06:31

## 2019-05-15 RX ADMIN — IBUPROFEN 400 MG: 400 TABLET, FILM COATED ORAL at 06:33

## 2019-05-15 RX ADMIN — ACYCLOVIR 400 MG: 200 CAPSULE ORAL at 06:33

## 2019-05-15 RX ADMIN — IBUPROFEN 400 MG: 400 TABLET, FILM COATED ORAL at 12:45

## 2019-05-15 ASSESSMENT — PAIN DESCRIPTION - LOCATION
LOCATION: ABDOMEN
LOCATION: ABDOMEN

## 2019-05-15 ASSESSMENT — PAIN DESCRIPTION - DESCRIPTORS
DESCRIPTORS: CRAMPING
DESCRIPTORS: CRAMPING

## 2019-05-15 ASSESSMENT — PAIN DESCRIPTION - PAIN TYPE
TYPE: ACUTE PAIN
TYPE: ACUTE PAIN

## 2019-05-15 ASSESSMENT — PAIN SCALES - GENERAL
PAINLEVEL_OUTOF10: 4
PAINLEVEL_OUTOF10: 0
PAINLEVEL_OUTOF10: 3
PAINLEVEL_OUTOF10: 4
PAINLEVEL_OUTOF10: 3
PAINLEVEL_OUTOF10: 4

## 2019-05-15 ASSESSMENT — PAIN DESCRIPTION - FREQUENCY
FREQUENCY: INTERMITTENT
FREQUENCY: INTERMITTENT

## 2019-05-15 ASSESSMENT — PAIN DESCRIPTION - ORIENTATION
ORIENTATION: LOWER;MID
ORIENTATION: LOWER;MID

## 2019-05-15 NOTE — DISCHARGE SUMMARY
[address]                               DISCHARGE SUMMARY    PATIENT NAME: Rubi Frost                    :        1995  MED REC NO:   [id number]                            ROOM:       LD08  ACCOUNT NO:   [de-identified]                           ADMIT DATE: 2019. PROVIDER:     Marita Bergeron MD                  DISCHARGE DATE:      ADMITTING DIAGNOSES:  Intrauterine pregnancy, at term, in labor. DISCHARGE DIAGNOSES:  Intrauterine pregnancy, at term, in labor. PROCEDURE PERFORMED:  Vaginal delivery. HOSPITAL COURSE IN DETAIL:  The patient is doing well. Voiding well. Minimal lochia. Pain is under adequate control. Admission CBC reveals  a white count of 11.5, hemoglobin 11.8, platelets 325,211. Immediate postpartum  white count was 13.2, hemoglobin 11.2, platelets are 910,295. Postpartum day-1 hemoglobin is 10.1. She is afebrile. Heart rate is  89-86, blood pressure 102-106 over 63-54. Fundus is firm and 2  fingerbreadths below the umbilicus. Perineum is dry and intact. ASSESSMENT:  The patient is doing well, postpartum day-1, wishes to go  home today. Stable. PLAN:  Home today with fever, bleeding, emboli, lifting, climbing,  driving precautions. She is to follow up at the office in 6 weeks. She  indicates understanding of all instructions.           Jacquelin Bryant MD    D: 05/15/2019 13:31:25       T: 05/15/2019 14:29:09     [email or web address]  Job#: [id number]     Doc#: [id number]    CC:

## 2019-05-15 NOTE — PLAN OF CARE
Mom states baby nursing well. Denies questions at present. Encouraged to call if has any questions or concerns.   Gómez Melendrez

## 2019-06-15 ENCOUNTER — HOSPITAL ENCOUNTER (EMERGENCY)
Age: 24
Discharge: HOME | End: 2019-06-15
Payer: COMMERCIAL

## 2019-06-15 VITALS
RESPIRATION RATE: 20 BRPM | WEIGHT: 175 LBS | DIASTOLIC BLOOD PRESSURE: 71 MMHG | TEMPERATURE: 97.8 F | HEART RATE: 67 BPM | BODY MASS INDEX: 28.25 KG/M2 | SYSTOLIC BLOOD PRESSURE: 107 MMHG | OXYGEN SATURATION: 98 %

## 2019-06-15 DIAGNOSIS — J02.0 STREPTOCOCCAL SORE THROAT: Primary | ICD-10-CM

## 2019-06-15 LAB — STREP GRP A PCR: POSITIVE

## 2019-06-15 PROCEDURE — 87880 STREP A ASSAY W/OPTIC: CPT

## 2019-06-15 PROCEDURE — 99212 OFFICE O/P EST SF 10 MIN: CPT

## 2019-06-15 RX ORDER — AMOXICILLIN 500 MG/1
500 CAPSULE ORAL 2 TIMES DAILY
Qty: 20 CAPSULE | Refills: 0 | Status: SHIPPED | OUTPATIENT
Start: 2019-06-15 | End: 2019-06-25

## 2019-06-15 NOTE — ED PROVIDER NOTES
and no rash noted. Nursing note and vitals reviewed. Procedures    [facility]       --------------------------------------------- PAST HISTORY ---------------------------------------------  Past Medical History:  has a past medical history of Anemia, Depression, Endometriosis, Heart abnormality, Herpes simplex virus (HSV) infection, and Postpartum depression. Past Surgical History:  has a past surgical history that includes Cardiac surgery (06/2015); Colonoscopy; pr lap,diagnostic abdomen (N/A, 6/29/2018); laparoscopy; and [place] tooth extraction (2017). Social History:  reports that she has never smoked. She has never used smokeless tobacco. She reports that she does not drink alcohol or use drugs. Family History: family history includes Arthritis in her paternal grandfather and paternal grandmother; Depression in her brother; Diabetes in her paternal uncle; Heart Disease in her father, maternal aunt, and maternal grandfather; Mental Illness in her brother; Yevgeniy Balloon / [place] in her mother. The patients home medications have been reviewed. Allergies: Latex    -------------------------------------------------- RESULTS -------------------------------------------------  Results for orders placed or performed during the hospital encounter of 06/15/19   Strep Screen Group A Throat   Result Value Ref Range    Strep Grp A PCR POSITIVE Negative     No orders to display       ------------------------- NURSING NOTES AND VITALS REVIEWED ---------------------------   The nursing notes within the ED encounter and vital signs as below have been reviewed.    /71   Pulse 67   Temp 97.8 °F (36.6 °C) (Oral)   Resp 20   Wt 175 lb (79.4 kg)   LMP 08/11/2018 (Approximate)   SpO2 98%   BMI 28.25 kg/m²   Oxygen Saturation Interpretation: Normal      ------------------------------------------ PROGRESS NOTES ------------------------------------------   I have spoken with the patient and discussed todays results, in addition to providing specific details for the plan of care and counseling regarding the diagnosis and prognosis. Their questions are answered at this time and they are agreeable with the plan.      --------------------------------- ADDITIONAL PROVIDER NOTES ---------------------------------     This patient is stable for discharge. I have shared the specific conditions for return, as well as the importance of follow-up. * NOTE: This report was transcribed using voice recognition software. Every effort was made to ensure accuracy; however, inadvertent computerized transcription errors may be present.    --------------------------------- IMPRESSION AND DISPOSITION ---------------------------------    IMPRESSION  1.  Streptococcal sore throat        DISPOSITION  Disposition: Discharge to home  Patient condition is good         Sotero Ledesma PA-C  06/15/19 2354

## 2022-02-28 ENCOUNTER — NURSE ONLY (OUTPATIENT)
Dept: CARDIOLOGY CLINIC | Age: 27
End: 2022-02-28
Payer: COMMERCIAL

## 2022-02-28 DIAGNOSIS — R00.2 PALPITATION: Primary | ICD-10-CM

## 2022-03-09 DIAGNOSIS — R00.2 PALPITATION: ICD-10-CM

## 2023-04-11 ENCOUNTER — OFFICE VISIT (OUTPATIENT)
Dept: PRIMARY CARE | Facility: CLINIC | Age: 28
End: 2023-04-11
Payer: COMMERCIAL

## 2023-04-11 VITALS
RESPIRATION RATE: 16 BRPM | HEIGHT: 66 IN | DIASTOLIC BLOOD PRESSURE: 62 MMHG | TEMPERATURE: 98.2 F | HEART RATE: 87 BPM | WEIGHT: 178 LBS | BODY MASS INDEX: 28.61 KG/M2 | SYSTOLIC BLOOD PRESSURE: 116 MMHG | OXYGEN SATURATION: 99 %

## 2023-04-11 DIAGNOSIS — F32.A DEPRESSION, UNSPECIFIED DEPRESSION TYPE: ICD-10-CM

## 2023-04-11 DIAGNOSIS — Z76.89 ENCOUNTER TO ESTABLISH CARE: Primary | ICD-10-CM

## 2023-04-11 DIAGNOSIS — I47.10 SVT (SUPRAVENTRICULAR TACHYCARDIA) (CMS-HCC): ICD-10-CM

## 2023-04-11 DIAGNOSIS — Z71.3 WEIGHT LOSS COUNSELING, ENCOUNTER FOR: ICD-10-CM

## 2023-04-11 PROBLEM — R10.2 PELVIC PAIN IN FEMALE: Status: RESOLVED | Noted: 2018-06-29 | Resolved: 2023-04-11

## 2023-04-11 PROBLEM — B35.9 DERMATOPHYTOSIS: Status: RESOLVED | Noted: 2017-12-05 | Resolved: 2023-04-11

## 2023-04-11 PROBLEM — J20.9 ACUTE BRONCHITIS: Status: ACTIVE | Noted: 2017-03-13

## 2023-04-11 PROBLEM — O41.8X10 SUBCHORIONIC HEMATOMA IN FIRST TRIMESTER (HHS-HCC): Status: RESOLVED | Noted: 2019-02-21 | Resolved: 2023-04-11

## 2023-04-11 PROBLEM — R00.2 PALPITATIONS: Status: ACTIVE | Noted: 2018-09-18

## 2023-04-11 PROBLEM — O41.8X10 SUBCHORIONIC HEMATOMA IN FIRST TRIMESTER (HHS-HCC): Status: ACTIVE | Noted: 2019-02-21

## 2023-04-11 PROBLEM — B34.9 VIREMIA: Status: ACTIVE | Noted: 2019-09-10

## 2023-04-11 PROBLEM — I45.6 WOLFF-PARKINSON-WHITE (WPW) PATTERN: Status: ACTIVE | Noted: 2019-02-10

## 2023-04-11 PROBLEM — O26.90: Status: RESOLVED | Noted: 2019-03-14 | Resolved: 2023-04-11

## 2023-04-11 PROBLEM — J20.9 ACUTE BRONCHITIS: Status: RESOLVED | Noted: 2017-03-13 | Resolved: 2023-04-11

## 2023-04-11 PROBLEM — M54.32 BILATERAL SCIATICA: Status: RESOLVED | Noted: 2018-02-19 | Resolved: 2023-04-11

## 2023-04-11 PROBLEM — A49.1 STREPTOCOCCAL INFECTION, UNSPECIFIED SITE: Status: ACTIVE | Noted: 2017-05-15

## 2023-04-11 PROBLEM — Z86.79 PERSONAL HISTORY OF SUPRAVENTRICULAR TACHYCARDIA: Status: ACTIVE | Noted: 2019-02-21

## 2023-04-11 PROBLEM — M54.31 BILATERAL SCIATICA: Status: ACTIVE | Noted: 2018-02-19

## 2023-04-11 PROBLEM — J03.90 ACUTE TONSILLITIS: Status: RESOLVED | Noted: 2017-05-15 | Resolved: 2023-04-11

## 2023-04-11 PROBLEM — J06.9 URI (UPPER RESPIRATORY INFECTION): Status: ACTIVE | Noted: 2023-04-11

## 2023-04-11 PROBLEM — H66.90 OTITIS MEDIA: Status: ACTIVE | Noted: 2017-03-13

## 2023-04-11 PROBLEM — O26.90: Status: ACTIVE | Noted: 2019-03-14

## 2023-04-11 PROBLEM — J06.9 URI (UPPER RESPIRATORY INFECTION): Status: RESOLVED | Noted: 2023-04-11 | Resolved: 2023-04-11

## 2023-04-11 PROBLEM — Z86.59 HISTORY OF DEPRESSION: Status: ACTIVE | Noted: 2019-02-21

## 2023-04-11 PROBLEM — R10.2 PELVIC PAIN IN FEMALE: Status: ACTIVE | Noted: 2018-06-29

## 2023-04-11 PROBLEM — B34.9 VIREMIA: Status: RESOLVED | Noted: 2019-09-10 | Resolved: 2023-04-11

## 2023-04-11 PROBLEM — J03.90 ACUTE TONSILLITIS: Status: ACTIVE | Noted: 2017-05-15

## 2023-04-11 PROBLEM — H66.90 OTITIS MEDIA: Status: RESOLVED | Noted: 2017-03-13 | Resolved: 2023-04-11

## 2023-04-11 PROBLEM — O46.8X1 SUBCHORIONIC HEMATOMA IN FIRST TRIMESTER (HHS-HCC): Status: ACTIVE | Noted: 2019-02-21

## 2023-04-11 PROBLEM — M54.32 BILATERAL SCIATICA: Status: ACTIVE | Noted: 2018-02-19

## 2023-04-11 PROBLEM — S33.5XXA LUMBAR SPRAIN: Status: ACTIVE | Noted: 2018-02-19

## 2023-04-11 PROBLEM — M54.31 BILATERAL SCIATICA: Status: RESOLVED | Noted: 2018-02-19 | Resolved: 2023-04-11

## 2023-04-11 PROBLEM — A49.1 STREPTOCOCCAL INFECTION, UNSPECIFIED SITE: Status: RESOLVED | Noted: 2017-05-15 | Resolved: 2023-04-11

## 2023-04-11 PROBLEM — N80.9 ENDOMETRIOSIS: Status: ACTIVE | Noted: 2018-09-10

## 2023-04-11 PROBLEM — O46.8X1 SUBCHORIONIC HEMATOMA IN FIRST TRIMESTER (HHS-HCC): Status: RESOLVED | Noted: 2019-02-21 | Resolved: 2023-04-11

## 2023-04-11 PROBLEM — B35.9 DERMATOPHYTOSIS: Status: ACTIVE | Noted: 2017-12-05

## 2023-04-11 PROBLEM — S33.5XXA LUMBAR SPRAIN: Status: RESOLVED | Noted: 2018-02-19 | Resolved: 2023-04-11

## 2023-04-11 PROCEDURE — 99203 OFFICE O/P NEW LOW 30 MIN: CPT | Performed by: FAMILY MEDICINE

## 2023-04-11 PROCEDURE — 1036F TOBACCO NON-USER: CPT | Performed by: FAMILY MEDICINE

## 2023-04-11 RX ORDER — FLUOXETINE HYDROCHLORIDE 20 MG/1
60 CAPSULE ORAL
COMMUNITY
Start: 2023-03-17

## 2023-04-11 RX ORDER — BUPROPION HYDROCHLORIDE 150 MG/1
150 TABLET ORAL EVERY MORNING
Qty: 30 TABLET | Refills: 0 | Status: SHIPPED | OUTPATIENT
Start: 2023-04-11 | End: 2023-05-09 | Stop reason: SDUPTHER

## 2023-04-11 RX ORDER — LEVONORGESTREL AND ETHINYL ESTRADIOL 0.15-0.03
1 KIT ORAL DAILY
COMMUNITY
Start: 2023-03-30

## 2023-04-11 ASSESSMENT — ENCOUNTER SYMPTOMS
CONSTITUTIONAL NEGATIVE: 1
OCCASIONAL FEELINGS OF UNSTEADINESS: 0
CONFUSION: 0
WEAKNESS: 0
BRUISES/BLEEDS EASILY: 0
COUGH: 0
LIGHT-HEADEDNESS: 0
NERVOUS/ANXIOUS: 0
POLYPHAGIA: 0
HEADACHES: 0
DEPRESSION: 0
LOSS OF SENSATION IN FEET: 0
WHEEZING: 0
SLEEP DISTURBANCE: 0
GASTROINTESTINAL NEGATIVE: 1
AGITATION: 0
ADENOPATHY: 0
DIZZINESS: 0
SHORTNESS OF BREATH: 0
ARTHRALGIAS: 0
POLYDIPSIA: 0
PALPITATIONS: 0
EYES NEGATIVE: 1
MYALGIAS: 0
CHEST TIGHTNESS: 0

## 2023-04-11 ASSESSMENT — PATIENT HEALTH QUESTIONNAIRE - PHQ9
2. FEELING DOWN, DEPRESSED OR HOPELESS: NOT AT ALL
1. LITTLE INTEREST OR PLEASURE IN DOING THINGS: NOT AT ALL
SUM OF ALL RESPONSES TO PHQ9 QUESTIONS 1 AND 2: 0

## 2023-04-11 NOTE — PROGRESS NOTES
"Subjective   Patient ID: Alina Nair is a 27 y.o. female who presents for New Patient Visit (Discuss weight loss options).    Concern for weight loss counseling - Reports she has struggled to lose weight since having her son. Has tried diet and exercise intermittently. Currently not exercising, states she was doing cardio exercise without any results but is uncertain how long this was sustained Is eating a well rounded diet with a lot of vegetables and lean proteins. Does not drink caffeine or tea. Reports drinking 50 ounces of water / day.      Anxiety - On Prozac. Reports this is doing well. Denies any concerns.     Previous PCP in [place].   Pap 2023. Follow with OBGYN.     This note was completed by Teresa Coffey [employer] student acting as scribe for Alina Leatha SLATER.           Review of Systems   Constitutional: Negative.    HENT: Negative.     Eyes: Negative.    Respiratory:  Negative for cough, chest tightness, shortness of breath and wheezing.    Cardiovascular:  Negative for chest pain, palpitations and leg swelling.   Gastrointestinal: Negative.    Endocrine: Negative for cold intolerance, heat intolerance, polydipsia, polyphagia and polyuria.   Genitourinary: Negative.    Musculoskeletal:  Negative for arthralgias and myalgias.   Skin: Negative.    Allergic/Immunologic: Negative for food allergies.   Neurological:  Negative for dizziness, syncope, weakness, light-headedness and headaches.   Hematological:  Negative for adenopathy. Does not bruise/bleed easily.   Psychiatric/Behavioral:  Negative for agitation, confusion, self-injury, sleep disturbance and suicidal ideas. The patient is not nervous/anxious.        Objective   /62   Pulse 87   Temp 36.8 °C (98.2 °F)   Resp 16   Ht 1.676 m (5' 6\")   Wt 80.7 kg (178 lb)   SpO2 99%   BMI 28.73 kg/m²     Physical Exam  Vitals reviewed.   Constitutional:       General: She is not in acute distress.     Appearance: Normal appearance. She is not " ill-appearing.   HENT:      Head: Normocephalic and atraumatic.      Nose: Nose normal. No congestion or rhinorrhea.      Mouth/Throat:      Mouth: Mucous membranes are moist.      Pharynx: Oropharynx is clear. No oropharyngeal exudate or posterior oropharyngeal erythema.   Eyes:      Conjunctiva/sclera: Conjunctivae normal.      Pupils: Pupils are equal, round, and reactive to light.   Cardiovascular:      Rate and Rhythm: Normal rate.      Pulses: Normal pulses.   Pulmonary:      Effort: Pulmonary effort is normal. No respiratory distress.      Breath sounds: Normal breath sounds. No wheezing.   Abdominal:      General: Abdomen is flat. Bowel sounds are normal. There is no distension.      Palpations: Abdomen is soft. There is no mass.      Tenderness: There is no abdominal tenderness. There is no guarding.   Musculoskeletal:         General: No swelling, tenderness or deformity. Normal range of motion.      Cervical back: Normal range of motion and neck supple.      Right lower leg: No edema.      Left lower leg: No edema.   Skin:     General: Skin is warm and dry.      Capillary Refill: Capillary refill takes less than 2 seconds.      Findings: No bruising, lesion or rash.   Neurological:      General: No focal deficit present.      Mental Status: She is alert and oriented to person, place, and time.      Sensory: No sensory deficit.      Motor: No weakness.      Coordination: Coordination normal.      Gait: Gait normal.   Psychiatric:         Mood and Affect: Mood normal.         Behavior: Behavior normal.         Thought Content: Thought content normal.         Judgment: Judgment normal.       Assessment/Plan   Problem List Items Addressed This Visit          Circulatory    SVT (supraventricular tachycardia) (CMS/HCC)     Other Visit Diagnoses       Encounter to establish care    -  Primary    Weight loss counseling, encounter for        Depression, unspecified depression type        Relevant Medications     buPROPion XL (Wellbutrin XL) 150 mg 24 hr tablet

## 2023-05-09 DIAGNOSIS — F32.A DEPRESSION, UNSPECIFIED DEPRESSION TYPE: ICD-10-CM

## 2023-05-09 RX ORDER — BUPROPION HYDROCHLORIDE 150 MG/1
150 TABLET ORAL EVERY MORNING
Qty: 30 TABLET | Refills: 0 | Status: SHIPPED | OUTPATIENT
Start: 2023-05-09 | End: 2023-06-08

## 2024-02-01 ENCOUNTER — HOSPITAL ENCOUNTER (OUTPATIENT)
Dept: RADIOLOGY | Facility: HOSPITAL | Age: 29
Discharge: HOME | End: 2024-02-01
Payer: COMMERCIAL

## 2024-02-01 DIAGNOSIS — M25.559 HIP PAIN: ICD-10-CM

## 2024-02-01 DIAGNOSIS — M54.50 LOWER BACK PAIN: ICD-10-CM

## 2024-02-01 PROCEDURE — 72100 X-RAY EXAM L-S SPINE 2/3 VWS: CPT | Performed by: RADIOLOGY

## 2024-02-01 PROCEDURE — 73502 X-RAY EXAM HIP UNI 2-3 VIEWS: CPT | Mod: LEFT SIDE | Performed by: RADIOLOGY

## 2024-02-01 PROCEDURE — 72100 X-RAY EXAM L-S SPINE 2/3 VWS: CPT

## 2024-02-01 PROCEDURE — 73502 X-RAY EXAM HIP UNI 2-3 VIEWS: CPT | Mod: LT

## 2024-03-11 DIAGNOSIS — M25.552 LEFT HIP PAIN: Primary | ICD-10-CM

## 2024-07-23 ENCOUNTER — HOSPITAL ENCOUNTER (OUTPATIENT)
Dept: RADIOLOGY | Facility: HOSPITAL | Age: 29
Discharge: HOME | End: 2024-07-23
Payer: COMMERCIAL

## 2024-07-23 DIAGNOSIS — M25.572 PAIN IN LEFT ANKLE AND JOINTS OF LEFT FOOT: ICD-10-CM

## 2024-07-23 PROCEDURE — 73620 X-RAY EXAM OF FOOT: CPT | Mod: LT

## 2024-07-23 PROCEDURE — 73600 X-RAY EXAM OF ANKLE: CPT | Mod: LT

## 2025-04-15 DIAGNOSIS — M25.561 ACUTE PAIN OF RIGHT KNEE: Primary | ICD-10-CM

## (undated) DEVICE — TOTAL TRAY, 16FR 10ML SIL FOLEY, URN: Brand: MEDLINE

## (undated) DEVICE — APPLICATOR PREP 26ML 0.7% IOD POVACRYLEX 74% ISO ALC ST

## (undated) DEVICE — GOWN,SIRUS,NONRNF,SETINSLV,XL,20/CS: Brand: MEDLINE

## (undated) DEVICE — PACK,AURORA,LAVH: Brand: MEDLINE

## (undated) DEVICE — SYRINGE, LUER LOCK, 10ML: Brand: MEDLINE

## (undated) DEVICE — STRIP,CLOSURE,WOUND,MEDI-STRIP,1/2X4: Brand: MEDLINE

## (undated) DEVICE — GAUZE,SPONGE,4"X4",16PLY,XRAY,STRL,LF: Brand: MEDLINE

## (undated) DEVICE — MICRO TIP WIPE: Brand: DEVON

## (undated) DEVICE — SET ENDO INSTR GRN LAPAROSCOPIC

## (undated) DEVICE — SEALER ENDOSCP L37CM NANO COAT BLNT TIP LAP DIV

## (undated) DEVICE — BANDAGE,GAUZE,4.5"X4.1YD,STERILE,LF: Brand: MEDLINE

## (undated) DEVICE — TOWEL,OR,DSP,ST,BLUE,STD,6/PK,12PK/CS: Brand: MEDLINE

## (undated) DEVICE — DOUBLE BASIN SET: Brand: MEDLINE INDUSTRIES, INC.

## (undated) DEVICE — 40586 ADVANCED TRENDELENBURG POSITIONING KIT: Brand: 40586 ADVANCED TRENDELENBURG POSITIONING KIT

## (undated) DEVICE — ANTI-FOG SOLUTION WITH FOAM PAD: Brand: DEVON

## (undated) DEVICE — SET ENDO INSTR LAPAROSCOPIC INCISIONAL

## (undated) DEVICE — NDL CNTR 40CT FM MAG: Brand: MEDLINE INDUSTRIES, INC.

## (undated) DEVICE — SOLUTION IV 1000ML LAC RINGERS PH 6.5 INJ USP VIAFLX PLAS

## (undated) DEVICE — PUMP SUC IRR TBNG L10FT W/ HNDPC ASSEMB STRYKEFLOW 2

## (undated) DEVICE — TROCAR: Brand: KII SHIELDED BLADED ACCESS SYSTEM

## (undated) DEVICE — STANDARD HYPODERMIC NEEDLE,POLYPROPYLENE HUB: Brand: MONOJECT

## (undated) DEVICE — BINDER ABD M/L H12IN FOR 46-62IN WHT 4 SLD PNL DSGN HOOP

## (undated) DEVICE — SYRINGE MED 50ML LUERLOCK TIP

## (undated) DEVICE — TRAY,VAG PREP,2PR VNYL GLV,4 C: Brand: MEDLINE INDUSTRIES, INC.

## (undated) DEVICE — INTENDED FOR TISSUE SEPARATION, AND OTHER PROCEDURES THAT REQUIRE A SHARP SURGICAL BLADE TO PUNCTURE OR CUT.: Brand: BARD-PARKER ® STAINLESS STEEL BLADES

## (undated) DEVICE — CATHETER,URETHRAL,VINYL,MALE,16",16 FR: Brand: MEDLINE

## (undated) DEVICE — SYRINGE,TOOMEY,IRRIGATION,70CC,STERILE: Brand: MEDLINE

## (undated) DEVICE — CAMERA STRYKER 1488 HD GEN

## (undated) DEVICE — LAPAROSCOPIC SCISSORS: Brand: EPIX LAPAROSCOPIC SCISSORS

## (undated) DEVICE — GLOVE SURG SZ 65 THK91MIL LTX FREE SYN POLYISOPRENE

## (undated) DEVICE — NEEDLE SPNL 22GA L7IN BLK HUB S STL W/ QNCKE PNT W/OUT

## (undated) DEVICE — [HIGH FLOW INSUFFLATOR,  DO NOT USE IF PACKAGE IS DAMAGED,  KEEP DRY,  KEEP AWAY FROM SUNLIGHT,  PROTECT FROM HEAT AND RADIOACTIVE SOURCES.]: Brand: PNEUMOSURE

## (undated) DEVICE — PENCIL ES L3M BTTN SWCH HOLSTER W/ BLDE ELECTRD EDGE

## (undated) DEVICE — PAD MATERNITY CURITY ADH STRIP DISP

## (undated) DEVICE — TRAY PROCED DILATATION CURETTAGE

## (undated) DEVICE — MARKER,SKIN,WI/RULER AND LABELS: Brand: MEDLINE

## (undated) DEVICE — INSUFFLATION NEEDLE TO ESTABLISH PNEUMOPERITONEUM.: Brand: INSUFFLATION NEEDLE

## (undated) DEVICE — TROCAR: Brand: KII® SLEEVE